# Patient Record
Sex: MALE | Race: WHITE | NOT HISPANIC OR LATINO | Employment: OTHER | URBAN - METROPOLITAN AREA
[De-identification: names, ages, dates, MRNs, and addresses within clinical notes are randomized per-mention and may not be internally consistent; named-entity substitution may affect disease eponyms.]

---

## 2021-02-03 ENCOUNTER — IMMUNIZATION (OUTPATIENT)
Dept: PEDIATRICS | Facility: CLINIC | Age: 81
End: 2021-02-03
Payer: MEDICARE

## 2021-02-03 PROCEDURE — 91300 PR COVID VAC PFIZER DIL RECON 30 MCG/0.3 ML IM: CPT

## 2021-02-03 PROCEDURE — 0001A PR COVID VAC PFIZER DIL RECON 30 MCG/0.3 ML IM: CPT

## 2021-02-24 ENCOUNTER — IMMUNIZATION (OUTPATIENT)
Dept: PEDIATRICS | Facility: CLINIC | Age: 81
End: 2021-02-24
Attending: INTERNAL MEDICINE
Payer: MEDICARE

## 2021-02-24 PROCEDURE — 0002A PR COVID VAC PFIZER DIL RECON 30 MCG/0.3 ML IM: CPT

## 2021-02-24 PROCEDURE — 91300 PR COVID VAC PFIZER DIL RECON 30 MCG/0.3 ML IM: CPT

## 2021-03-07 ENCOUNTER — HEALTH MAINTENANCE LETTER (OUTPATIENT)
Age: 81
End: 2021-03-07

## 2021-10-11 ENCOUNTER — HEALTH MAINTENANCE LETTER (OUTPATIENT)
Age: 81
End: 2021-10-11

## 2022-03-27 ENCOUNTER — HEALTH MAINTENANCE LETTER (OUTPATIENT)
Age: 82
End: 2022-03-27

## 2022-06-02 ENCOUNTER — TELEPHONE (OUTPATIENT)
Dept: FAMILY MEDICINE | Facility: CLINIC | Age: 82
End: 2022-06-02
Payer: MEDICARE

## 2022-06-02 NOTE — TELEPHONE ENCOUNTER
Called pt's wife to discuss role of [facility]. Discussed what services FNAs are able to provide for non-established and established patients of [facility]. Gave phone number for general FNA line and instructed her to have Tunde call if he's interested in speaking with an FNA.    Wife states the patient has had chronic hip pain that they would like to get assessed. He's also looking to establish care as his current PCP is semi-retired. They will wait for upcoming appointment but was hoping to get in sooner. Did offer to transfer the spouse to scheduling to see if another provider was available for a sooner date or at another clinic, and she declined.    Encouraged wife to have the patient call if he's interested in speaking with a FNA. She verbalized her understanding.    No further questions/concerns at this time.      Rhoda Brennan RN   06/02/22 11:35 AM  [facility] Nurse Advisor

## 2022-06-02 NOTE — TELEPHONE ENCOUNTER
Reviewed chart, Pt is not an established primary care patient. Has never seen a Dr. Myers or a primary care provider at [facility]. There are no 40 min appointments available with Sylvia Myers MD sooner.     Advise pt speak with central scheduling to see if there is an appointment with another provider available sooner or care advise if allows fna to triage.     Please do not send message back to sender, due to non established pt, should be directed to fna.  Thank you  Ilda Cardona RN  [facility]

## 2022-06-02 NOTE — TELEPHONE ENCOUNTER
Reason for call:  Other   Patient called regarding (reason for call): appointment  Additional comments: Patient's spouse Kindra to inquire if a new appointment with Dr. Myers could be scheduld on an earlier date than the patient's 6/24/22 current date, due to ongoing back discomfort that has been worsening and making it more difficult for the patient to walk. Please advise. Declined fna.    Phone number to reach patient:  Home number on file [phone] (home)     Best Time:  Asap    Can we leave a detailed message on this number?  YES    Travel screening: Not Applicable

## 2022-06-24 ENCOUNTER — OFFICE VISIT (OUTPATIENT)
Dept: FAMILY MEDICINE | Facility: CLINIC | Age: 82
End: 2022-06-24
Payer: MEDICARE

## 2022-06-24 VITALS
RESPIRATION RATE: 18 BRPM | HEART RATE: 78 BPM | TEMPERATURE: 98.8 F | WEIGHT: 233 LBS | HEIGHT: 66 IN | SYSTOLIC BLOOD PRESSURE: 130 MMHG | BODY MASS INDEX: 37.45 KG/M2 | OXYGEN SATURATION: 95 % | DIASTOLIC BLOOD PRESSURE: 67 MMHG

## 2022-06-24 DIAGNOSIS — Z23 ENCOUNTER FOR IMMUNIZATION: ICD-10-CM

## 2022-06-24 DIAGNOSIS — N40.0 BENIGN PROSTATIC HYPERPLASIA, UNSPECIFIED WHETHER LOWER URINARY TRACT SYMPTOMS PRESENT: ICD-10-CM

## 2022-06-24 DIAGNOSIS — N18.30 CKD STAGE 3 DUE TO TYPE 2 DIABETES MELLITUS (H): ICD-10-CM

## 2022-06-24 DIAGNOSIS — M15.9 GENERALIZED OSTEOARTHRITIS: ICD-10-CM

## 2022-06-24 DIAGNOSIS — I10 BENIGN ESSENTIAL HYPERTENSION: ICD-10-CM

## 2022-06-24 DIAGNOSIS — E66.01 MORBID OBESITY (H): ICD-10-CM

## 2022-06-24 DIAGNOSIS — E08.319 DIABETIC RETINOPATHY WITHOUT MACULAR EDEMA ASSOCIATED WITH DIABETES MELLITUS DUE TO UNDERLYING CONDITION, UNSPECIFIED LATERALITY, UNSPECIFIED RETINOPATHY SEVERITY (H): Primary | ICD-10-CM

## 2022-06-24 DIAGNOSIS — E78.2 MIXED HYPERLIPIDEMIA: ICD-10-CM

## 2022-06-24 DIAGNOSIS — E11.22 CKD STAGE 3 DUE TO TYPE 2 DIABETES MELLITUS (H): ICD-10-CM

## 2022-06-24 PROBLEM — E11.9 DIABETES MELLITUS, TYPE II (H): Status: ACTIVE | Noted: 2017-11-14

## 2022-06-24 PROBLEM — K59.09 CHRONIC CONSTIPATION: Status: ACTIVE | Noted: 2017-03-09

## 2022-06-24 PROCEDURE — 99204 OFFICE O/P NEW MOD 45 MIN: CPT | Mod: 25 | Performed by: PREVENTIVE MEDICINE

## 2022-06-24 PROCEDURE — 90714 TD VACC NO PRESV 7 YRS+ IM: CPT | Performed by: PREVENTIVE MEDICINE

## 2022-06-24 PROCEDURE — 90471 IMMUNIZATION ADMIN: CPT | Performed by: PREVENTIVE MEDICINE

## 2022-06-24 RX ORDER — GLUCOSAMINE HCL/CHONDROITIN SU 500-400 MG
CAPSULE ORAL
Qty: 100 EACH | Refills: 3 | Status: SHIPPED | OUTPATIENT
Start: 2022-06-24

## 2022-06-24 RX ORDER — EZETIMIBE 10 MG/1
10 TABLET ORAL DAILY
Qty: 90 TABLET | Refills: 3 | Status: SHIPPED | OUTPATIENT
Start: 2022-06-24 | End: 2023-09-19

## 2022-06-24 RX ORDER — ROSUVASTATIN CALCIUM 20 MG/1
20 TABLET, COATED ORAL DAILY
Qty: 90 TABLET | Refills: 3 | Status: SHIPPED | OUTPATIENT
Start: 2022-06-24 | End: 2023-08-18

## 2022-06-24 RX ORDER — LANCETS
EACH MISCELLANEOUS
Qty: 100 EACH | Refills: 6 | Status: SHIPPED | OUTPATIENT
Start: 2022-06-24

## 2022-06-24 RX ORDER — MELOXICAM 7.5 MG/1
7.5 TABLET ORAL DAILY
Qty: 90 TABLET | Refills: 3 | Status: SHIPPED | OUTPATIENT
Start: 2022-06-24 | End: 2024-01-02

## 2022-06-24 RX ORDER — NAPROXEN SODIUM 220 MG
220 TABLET ORAL 2 TIMES DAILY WITH MEALS
COMMUNITY
End: 2022-07-27

## 2022-06-24 RX ORDER — EZETIMIBE 10 MG/1
TABLET ORAL
COMMUNITY
Start: 2022-05-04 | End: 2022-06-24

## 2022-06-24 RX ORDER — ALFUZOSIN HYDROCHLORIDE 10 MG/1
TABLET, EXTENDED RELEASE ORAL
COMMUNITY
Start: 2022-05-04 | End: 2022-06-24

## 2022-06-24 RX ORDER — SITAGLIPTIN AND METFORMIN HYDROCHLORIDE 1000; 50 MG/1; MG/1
1 TABLET, FILM COATED ORAL DAILY
Qty: 90 TABLET | Refills: 3 | Status: SHIPPED | OUTPATIENT
Start: 2022-06-24 | End: 2023-09-19

## 2022-06-24 RX ORDER — LOSARTAN POTASSIUM 50 MG/1
50 TABLET ORAL DAILY
Qty: 90 TABLET | Refills: 3 | Status: SHIPPED | OUTPATIENT
Start: 2022-06-24 | End: 2022-11-16

## 2022-06-24 RX ORDER — SPIRONOLACTONE 25 MG/1
TABLET ORAL
COMMUNITY
Start: 2021-04-28 | End: 2022-06-24

## 2022-06-24 RX ORDER — AMOXICILLIN 500 MG/1
CAPSULE ORAL
COMMUNITY
Start: 2021-12-04 | End: 2023-07-11

## 2022-06-24 RX ORDER — ROSUVASTATIN CALCIUM 20 MG/1
TABLET, COATED ORAL
COMMUNITY
Start: 2022-01-29 | End: 2022-06-24

## 2022-06-24 RX ORDER — SITAGLIPTIN AND METFORMIN HYDROCHLORIDE 1000; 50 MG/1; MG/1
TABLET, FILM COATED ORAL
COMMUNITY
Start: 2022-06-13 | End: 2022-06-24

## 2022-06-24 RX ORDER — ALFUZOSIN HYDROCHLORIDE 10 MG/1
10 TABLET, EXTENDED RELEASE ORAL DAILY
Qty: 90 TABLET | Refills: 3 | Status: SHIPPED | OUTPATIENT
Start: 2022-06-24 | End: 2023-08-18

## 2022-06-24 RX ORDER — LOSARTAN POTASSIUM 50 MG/1
TABLET ORAL
COMMUNITY
Start: 2021-04-28 | End: 2022-06-24

## 2022-06-24 RX ORDER — SPIRONOLACTONE 25 MG/1
25 TABLET ORAL DAILY
Qty: 90 TABLET | Refills: 3 | Status: SHIPPED | OUTPATIENT
Start: 2022-06-24 | End: 2023-09-19

## 2022-06-24 RX ORDER — ACETAMINOPHEN 500 MG
500-1000 TABLET ORAL EVERY 6 HOURS PRN
COMMUNITY

## 2022-06-24 ASSESSMENT — PAIN SCALES - GENERAL: PAINLEVEL: SEVERE PAIN (6)

## 2022-06-24 NOTE — PROGRESS NOTES
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 due to underlying condition, unspecified laterality, unspecified retinopathy severity (H)  -last Hba1C was 7  -continue current medication   - blood glucose monitoring (NO BRAND SPECIFIED) meter device kit  Dispense: 1 kit; Refill: 0  - blood glucose (NO BRAND SPECIFIED) test strip  Dispense: 100 strip; Refill: 6  - thin (NO BRAND SPECIFIED) lancets  Dispense: 100 each; Refill: 6  - alcohol swab prep pads  Dispense: 100 each; Refill: 3  - JANUMET  MG tablet  Dispense: 90 tablet; Refill: 3    Morbid obesity (H)  -Physical activity limited due to joint pains     Generalized osteoarthritis  -declined PT for now   - Orthopedic  Referral  - meloxicam (MOBIC) 7.5 MG tablet  Dispense: 90 tablet; Refill: 3    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)  -Last GFR was 49    Benign essential hypertension  -at goal  -continue current medication   - losartan (COZAAR) 50 MG tablet  Dispense: 90 tablet; Refill: 3  - spironolactone (ALDACTONE) 25 MG tablet  Dispense: 90 tablet; Refill: 3    Mixed hyperlipidemia  - ezetimibe (ZETIA) 10 MG tablet  Dispense: 90 tablet; Refill: 3  - rosuvastatin (CRESTOR) 20 MG tablet  Dispense: 90 tablet; Refill: 3    Benign prostatic hyperplasia, unspecified whether lower urinary tract symptoms present  - alfuzosin ER (UROXATRAL) 10 MG 24 hr tablet  Dispense: 90 tablet; Refill: 3    Encounter for immunization  - TD PRESERV FREE, IM (7+ YRS) (DECAVAC/TENIVAC)      Review of external notes as documented elsewhere in note  Prescription drug management  30 minutes spent on the date of the encounter doing chart review, history and exam, documentation and further activities per the note       BMI:   Estimated body mass index is 37.61 kg/m  as calculated from the following:    Height as of this encounter: 1.676 m (5' 6\").    Weight as of this encounter: 105.7 kg (233 lb).       Return in " about 6 months (around 12/24/2022) for Follow up, with me, in person.    Sylvia Myers MD MPH    [facility] is a 82 year old accompanied by his spouse., presenting for the following health issues:  Establish Care      HPI       Here as a new patient  Extensive review or records done via Care Everywhere    Pain History:  When did you first notice your pain? - Acute Pain   Have you seen any provider previously for this issue? Yes -   How has your pain affected your ability to work? Not applicable  Where in your body do you have pain? Musculoskeletal problem/pain  Onset/Duration: about 1 year ago  Description  Location: hip - left  Joint Swelling: no  Redness: no  Pain: YES  Warmth: no  Intensity:  6/10  Progression of Symptoms:  worsening and intermittent  Accompanying signs and symptoms:   Fevers: no  Numbness/tingling/weakness: no  History  Trauma to the area: YES- Fell  Recent illness:  no  Previous similar problem: YES  Previous evaluation:  YES  Precipitating or alleviating factors:  Aggravating factors include: walking  Therapies tried and outcome: acetaminophen    Has generalized pain, knees, hips etc. Has had both knees replaced and one hip replaced   Was a wrestler in high school and college  Has been using Biofreeze  CBD pain reliever  Has not had any recent cortisone injections   No recent PT , prefers not to continue at this time     Needs a new glucometer     Has been on Celebrex, did not help  Has not been on Meloxicam     Diabetes:  -eye exam done 5/9/22 with Shira  -not checking glucose since does not have a working glucometer       Hyperlipidemia Follow-Up      Are you regularly taking any medication or supplement to lower your cholesterol?   Yes- statin     Are you having muscle aches or other side effects that you think could be caused by your cholesterol lowering medication?  No    Hypertension Follow-up      Do you check your blood pressure  "regularly outside of the clinic? No     Are you following a low salt diet? Yes    Are your blood pressures ever more than 140 on the top number (systolic) OR more   than 90 on the bottom number (diastolic), for example 140/90? No      Review of Systems   Constitutional, HEENT, cardiovascular, pulmonary, gi and gu systems are negative, except as otherwise noted.      Objective    /67 (BP Location: Left arm, Patient Position: Chair, Cuff Size: Adult Large)   Pulse 78   Temp 98.8  F (37.1  C) (Tympanic)   Resp 18   Ht 1.676 m (5' 6\")   Wt 105.7 kg (233 lb)   SpO2 95%   BMI 37.61 kg/m    Body mass index is 37.61 kg/m .  Physical Exam   GENERAL APPEARANCE: healthy, alert and no distress  EYES: Eyes grossly normal to inspection and conjunctivae and sclerae normal  RESP: lungs clear to auscultation - no rales, rhonchi or wheezes  CV: regular rates and rhythm, normal S1 S2  NEURO: Normal strength and tone, mentation intact and speech normal  PSYCH: mentation appears normal      No results found for this or any previous visit (from the past 24 hour(s)).      Labs done with Shira 5/5/22:    Lipid panel: , LDL 55, HDL 30    BMP: Glucose 148, Cr 1.42, GFR 49, potassium normal    PSA 1.10    HbA1C 7.0                 .  ..  "

## 2022-06-24 NOTE — NURSING NOTE
Prior to immunization administration, verified patients identity using patient s name and date of birth. Please see Immunization Activity for additional information.     Screening Questionnaire for Adult Immunization    Are you sick today?   No   Do you have allergies to medications, food, a vaccine component or latex?   No   Have you ever had a serious reaction after receiving a vaccination?   No   Do you have a long-term health problem with heart, lung, kidney, or metabolic disease (e.g., diabetes), asthma, a blood disorder, no spleen, complement component deficiency, a cochlear implant, or a spinal fluid leak?  Are you on long-term aspirin therapy?   Yes   Do you have cancer, leukemia, HIV/AIDS, or any other immune system problem?   No   Do you have a parent, brother, or sister with an immune system problem?   No   In the past 3 months, have you taken medications that affect  your immune system, such as prednisone, other steroids, or anticancer drugs; drugs for the treatment of rheumatoid arthritis, Crohn s disease, or psoriasis; or have you had radiation treatments?   No   Have you had a seizure, or a brain or other nervous system problem?   No   During the past year, have you received a transfusion of blood or blood    products, or been given immune (gamma) globulin or antiviral drug?   No   For women: Are you pregnant or is there a chance you could become       pregnant during the next month?   No   Have you received any vaccinations in the past 4 weeks?   No     Immunization questionnaire was positive for at least one answer.  Notified provider aware.        Patient instructed to remain in clinic for 15 minutes afterwards, and to report any adverse reaction to me immediately.       Screening performed by Mayank Garcia MA on 6/24/2022 at 9:24 AM.

## 2022-06-24 NOTE — PATIENT INSTRUCTIONS
At [facility], we strive to deliver an exceptional experience to you, every time we see you. If you receive a survey, please complete it as we do value your feedback.  If you have MyChart, you can expect to receive results automatically within 24 hours of their completion.  Your provider will send a note interpreting your results as well.   If you do not have MyChart, you should receive your results in about a week by mail.    Your care team:                            Family Medicine Internal Medicine   MD John Sanchez MD Shantel Branch-Fleming, MD Srinivasa Vaka, MD Katya Belousova, ROSALIA White CNP, MD ([facility]   Slim Whitman, MD Latha Boyd MD Amelia Massimini APRN ROSALIA Nix CNP, MD             Clinic hours: Monday - Thursday 7 am-6 pm; Fridays 7 am-5 pm.   Urgent care: Monday - Friday 10 am- 8 pm; Saturday and Sunday 9 am-5 pm.    Clinic: [phone]       [facility]: Monday - Thursday 8 am - 7 pm; Friday 8 am - 6 pm  [facility]: [phone]       ACETAMINOPHEN 1000 MG EVERY 8 HOURS   LIDOCAINE PATCHES 4% OVER THE COUNTER  CAPSAICIN CREAM  DICLOFENAC GEL

## 2022-07-05 NOTE — TELEPHONE ENCOUNTER
DIAGNOSIS: Bilat knee pain -Generalized osteoarthritis *possible injections   APPOINTMENT DATE: 07/13/2022   NOTES STATUS DETAILS   OFFICE NOTE from referring provider Internal 06/24/2022 Dr Myers [facility]    OFFICE NOTE from other specialist N/A    DISCHARGE SUMMARY from hospital N/A    DISCHARGE REPORT from the ER N/A    OPERATIVE REPORT Care Everywhere 07/06/2015 LFT ARTHROPLASTY KNEE    06/16/2014  RT ARTHROPLASTY KNEE    06/25/2013 RT ARTHROPLASTY HIP    EMG report N/A    MEDICATION LIST N/A    MRI N/A    DEXA (osteoporosis/bone health) N/A    CT SCAN N/A    XRAYS (IMAGES & REPORTS) N/A

## 2022-07-12 ENCOUNTER — TELEPHONE (OUTPATIENT)
Dept: ORTHOPEDICS | Facility: CLINIC | Age: 82
End: 2022-07-12

## 2022-07-12 ENCOUNTER — OFFICE VISIT (OUTPATIENT)
Dept: URGENT CARE | Facility: URGENT CARE | Age: 82
End: 2022-07-12
Payer: MEDICARE

## 2022-07-12 VITALS
HEIGHT: 66 IN | TEMPERATURE: 100.2 F | SYSTOLIC BLOOD PRESSURE: 139 MMHG | DIASTOLIC BLOOD PRESSURE: 76 MMHG | WEIGHT: 234.1 LBS | OXYGEN SATURATION: 95 % | BODY MASS INDEX: 37.62 KG/M2 | HEART RATE: 73 BPM

## 2022-07-12 DIAGNOSIS — R47.89 WORD FINDING DIFFICULTY: ICD-10-CM

## 2022-07-12 DIAGNOSIS — R51.9 ACUTE INTRACTABLE HEADACHE, UNSPECIFIED HEADACHE TYPE: Primary | ICD-10-CM

## 2022-07-12 DIAGNOSIS — R41.0 CONFUSION: ICD-10-CM

## 2022-07-12 DIAGNOSIS — R41.3 MEMORY LOSS: ICD-10-CM

## 2022-07-12 DIAGNOSIS — R42 DIZZINESS: ICD-10-CM

## 2022-07-12 PROCEDURE — 99214 OFFICE O/P EST MOD 30 MIN: CPT | Performed by: PHYSICIAN ASSISTANT

## 2022-07-12 ASSESSMENT — ENCOUNTER SYMPTOMS
DIARRHEA: 0
CARDIOVASCULAR NEGATIVE: 1
DIZZINESS: 1
TREMORS: 0
HEMATOCHEZIA: 0
CONSTIPATION: 0
NAUSEA: 0
WEAKNESS: 0
COUGH: 0
FATIGUE: 0
SHORTNESS OF BREATH: 1
SORE THROAT: 0
NUMBNESS: 0
FACIAL ASYMMETRY: 0
CHILLS: 0
GASTROINTESTINAL NEGATIVE: 1
RHINORRHEA: 0
PALPITATIONS: 0
SINUS PRESSURE: 0
WHEEZING: 0
CHEST TIGHTNESS: 0
SEIZURES: 0
ABDOMINAL PAIN: 0
SPEECH DIFFICULTY: 1
FEVER: 0
CONFUSION: 1
HEADACHES: 1
VOMITING: 0
PARESTHESIAS: 0
LIGHT-HEADEDNESS: 0

## 2022-07-12 NOTE — TELEPHONE ENCOUNTER
Patient has a history of RTKA DOS: 6/16/2014 and LTKA DOS: 7/6/2015 and RTHA DOS: 6/25/2013.    Patient is having pain in bilateral knees and bilateral hip pain. They were thinking an injection would help but after talking with our sports medicine doctors, they stated a cortisone injection would not be indicated. I scheduled with Dr. Howell to discuss knee and hip pain. Informed patient that he will need xrays prior to appointment and to come 20 minutes early. They understood.      Radha Gallegos MA, ATC

## 2022-07-12 NOTE — PROGRESS NOTES
Santiago Griffiths is a 82 year old accompanied by his spouse and daughter, presenting for the following health issues:  Speech Problem and Altered Mental Status (Pt said that he seems confused)    HPI   Headache, dizziness, speech problem  Onset/Duration: 2days.  Symptoms started after having had dental work done yesterday.  Description  Location: frontal  Character: throbbing pain  Frequency:  daily  Duration:  hours  Wake with headaches: no  Able to do daily activities when headache present: YES  Intensity:  moderate  Progression of Symptoms:  same  Accompanying signs and symptoms:  Stiff neck: no  Neck or upper back pain: Yes  Sinus or URI symptoms: no  Fever: no  Nausea or vomiting: no  Dizziness: Yes along with memory loss, confusion, difficulty finding words.  Some shortness of breath but no chest pain, palpitations, orthopnea, PND or peripheral edema.    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 due to underlying condition (H)     Hypertension     Morbid obesity (H)     Hyperlipidemia     Current Outpatient Medications   Medication     acetaminophen (TYLENOL) 500 MG tablet     alcohol swab prep pads     alfuzosin ER (UROXATRAL) 10 MG 24 hr tablet     amoxicillin (AMOXIL) 500 MG capsule     blood glucose (NO BRAND SPECIFIED) test strip     blood glucose monitoring (NO BRAND SPECIFIED) meter device kit     ezetimibe (ZETIA) 10 MG tablet     HEMP OIL OR EXTRACT OR OTHER CBD CANNABINOID, NOT MEDICAL CANNABIS,      "JANUMET  MG tablet     losartan (COZAAR) 50 MG tablet     meloxicam (MOBIC) 7.5 MG tablet     naproxen sodium (ANAPROX) 220 MG tablet     rosuvastatin (CRESTOR) 20 MG tablet     spironolactone (ALDACTONE) 25 MG tablet     thin (NO BRAND SPECIFIED) lancets     No current facility-administered medications for this visit.        Allergies   Allergen Reactions     Ace Inhibitors Cough       Review of Systems   Constitutional: Negative for chills, fatigue and fever.   HENT: Negative for congestion, ear discharge, ear pain, hearing loss, rhinorrhea, sinus pressure and sore throat.    Respiratory: Positive for shortness of breath. Negative for cough, chest tightness and wheezing.    Cardiovascular: Negative.  Negative for chest pain, palpitations and peripheral edema.   Gastrointestinal: Negative.  Negative for abdominal pain, constipation, diarrhea, hematochezia, nausea and vomiting.   Neurological: Positive for dizziness, speech difficulty and headaches. Negative for tremors, seizures, syncope, facial asymmetry, weakness, light-headedness, numbness and paresthesias.   Psychiatric/Behavioral: Positive for confusion.   All other systems reviewed and are negative.           Objective    /76 (BP Location: Left arm, Patient Position: Sitting, Cuff Size: Adult Large)   Pulse 73   Temp 100.2  F (37.9  C) (Tympanic)   Ht 1.676 m (5' 6\")   Wt 106.2 kg (234 lb 1.6 oz)   SpO2 95%   BMI 37.78 kg/m    Body mass index is 37.78 kg/m .  Physical Exam  Vitals and nursing note reviewed.   Constitutional:       General: He is not in acute distress.     Appearance: Normal appearance. He is well-developed and normal weight. He is not ill-appearing.   HENT:      Head: Normocephalic and atraumatic.      Ears:      Comments: TMs are intact without any erythema or bulging bilaterally.  Airway is patent.     Nose: Nose normal.      Mouth/Throat:      Lips: Pink.      Mouth: Mucous membranes are moist.      Pharynx: Oropharynx " is clear. Uvula midline. No pharyngeal swelling, oropharyngeal exudate or posterior oropharyngeal erythema.   Eyes:      General: No scleral icterus.     Extraocular Movements: Extraocular movements intact.      Conjunctiva/sclera: Conjunctivae normal.      Pupils: Pupils are equal, round, and reactive to light.   Neck:      Thyroid: No thyromegaly.      Meningeal: Brudzinski's sign and Kernig's sign absent.   Cardiovascular:      Rate and Rhythm: Normal rate and regular rhythm.      Pulses: Normal pulses.      Heart sounds: Normal heart sounds, S1 normal and S2 normal. No murmur heard.    No friction rub. No gallop.   Pulmonary:      Effort: Pulmonary effort is normal. No respiratory distress.      Breath sounds: Normal breath sounds and air entry. No wheezing or rales.   Musculoskeletal:      Cervical back: Full passive range of motion without pain, normal range of motion and neck supple.   Lymphadenopathy:      Cervical: No cervical adenopathy.   Skin:     General: Skin is warm and dry.      Capillary Refill: Capillary refill takes less than 2 seconds.   Neurological:      General: No focal deficit present.      Mental Status: He is alert and oriented to person, place, and time.      GCS: GCS eye subscore is 4. GCS verbal subscore is 5. GCS motor subscore is 6.      Cranial Nerves: Cranial nerves 2-12 are intact. No cranial nerve deficit.      Sensory: Sensation is intact. No sensory deficit.      Motor: Motor function is intact.      Coordination: Coordination is intact. Coordination normal.      Gait: Gait abnormal.      Deep Tendon Reflexes: Reflexes are normal and symmetric.   Psychiatric:         Mood and Affect: Mood normal.         Behavior: Behavior normal.         Thought Content: Thought content normal.         Judgment: Judgment normal.          Assessment/Plan:  Acute intractable headache, unspecified headache type:  Along with dizziness, speech difficulty, and memory loss/confusion.  H&P is  concerning for CVA/TIA vs infectious cause vs metabolic cause.  Recommend further evaluation and management in the ER.  Will most likely need further workup with labs and/or imaging.  Patient has declined transportation via ambulance and will have family drive her/him.  Understands risks and benefits of ambulance transfer and s/he has declined.  Call 911 if worsening symptoms.  S/he plans to go to [place] [facility].  S/he left in stable condition with AVS in hand.  F/u with PCP after ER visit.     Dizziness    Confusion    Memory loss    Word finding difficulty        Izzy See OUMAR Da Silva              .  ..

## 2022-07-13 ENCOUNTER — PRE VISIT (OUTPATIENT)
Dept: ORTHOPEDICS | Facility: CLINIC | Age: 82
End: 2022-07-13

## 2022-07-18 NOTE — TELEPHONE ENCOUNTER
DIAGNOSIS: Bilateral knee and bilateral hip pain; s/p Bilateral knee arthroplasty done at [facility]   APPOINTMENT DATE: 07/26/2022   NOTES STATUS DETAILS   OFFICE NOTE from referring provider Internal 06/24/2022 Dr Myers [facility]   OFFICE NOTE from other specialist N/A    DISCHARGE SUMMARY from hospital N/A    DISCHARGE REPORT from the ER N/A    OPERATIVE REPORT Received 07/06/2015 LFT ARTHROPLASTY KNEE    06/16/2014  RT ARTHROPLASTY KNEE    06/25/2013 RT ARTHROPLASTY HIP    MEDICATION LIST N/A    EMG (for Spine) N/A    IMPLANT RECORD/STICKER N/A    LABS     CBC/DIFF N/A    CULTURES N/A    INJECTIONS DONE IN RADIOLOGY N/A    MRI N/A    CT SCAN N/A    XRAYS (IMAGES & REPORTS) N/A    TUMOR     PATHOLOGY  Slides & report N/A

## 2022-07-22 DIAGNOSIS — M25.561 BILATERAL KNEE PAIN: Primary | ICD-10-CM

## 2022-07-22 DIAGNOSIS — M25.562 BILATERAL KNEE PAIN: Primary | ICD-10-CM

## 2022-07-26 ENCOUNTER — ANCILLARY PROCEDURE (OUTPATIENT)
Dept: GENERAL RADIOLOGY | Facility: CLINIC | Age: 82
End: 2022-07-26
Attending: ORTHOPAEDIC SURGERY
Payer: MEDICARE

## 2022-07-26 ENCOUNTER — PRE VISIT (OUTPATIENT)
Dept: ORTHOPEDICS | Facility: CLINIC | Age: 82
End: 2022-07-26

## 2022-07-26 ENCOUNTER — OFFICE VISIT (OUTPATIENT)
Dept: ORTHOPEDICS | Facility: CLINIC | Age: 82
End: 2022-07-26
Payer: MEDICARE

## 2022-07-26 VITALS — BODY MASS INDEX: 34.07 KG/M2 | HEIGHT: 69 IN | WEIGHT: 230 LBS

## 2022-07-26 DIAGNOSIS — M25.561 BILATERAL KNEE PAIN: ICD-10-CM

## 2022-07-26 DIAGNOSIS — Z96.653 STATUS POST TOTAL BILATERAL KNEE REPLACEMENT: Primary | ICD-10-CM

## 2022-07-26 DIAGNOSIS — M25.562 BILATERAL KNEE PAIN: ICD-10-CM

## 2022-07-26 PROCEDURE — 73564 X-RAY EXAM KNEE 4 OR MORE: CPT | Mod: GC | Performed by: RADIOLOGY

## 2022-07-26 PROCEDURE — 99202 OFFICE O/P NEW SF 15 MIN: CPT | Performed by: ORTHOPAEDIC SURGERY

## 2022-07-26 ASSESSMENT — PAIN SCALES - GENERAL: PAINLEVEL: SEVERE PAIN (7)

## 2022-07-26 NOTE — TELEPHONE ENCOUNTER
DIAGNOSIS: neck and back pain onset 3 weeks ago    APPOINTMENT DATE: 08/12/2022   NOTES STATUS DETAILS   OFFICE NOTE from referring provider Internal 07/26/2022 Dr Howell   OFFICE NOTE from other specialist Care Everywhere 09/08/2021 Dr Mccartney [facility]   DISCHARGE SUMMARY from hospital N/A    DISCHARGE REPORT from the ER N/A    OPERATIVE REPORT N/A    EMG report N/A    MEDICATION LIST N/A    MRI N/A    DEXA (osteoporosis/bone health) N/A    CT SCAN N/A    XRAYS (IMAGES & REPORTS) Received 09/08/2021 lumbar spine     Images in PACS

## 2022-07-26 NOTE — NURSING NOTE
"Tunde Gutierrez's chief complaint for this visit includes:  Chief Complaint   Patient presents with     Consult     Bilateral knee pain.      PCP: Dylan Mccartney    Referring Provider:  No referring provider defined for this encounter.    Ht 1.753 m (5' 9\")   Wt 104.3 kg (230 lb)   BMI 33.97 kg/m    Severe Pain (7)     Pain increases with: Walking, standing  Previous surgeries: s/p RTKA dos: 6/16/2014; s/p LTKA dos: 7/16/15; s/p RTHA dos: 6/25/13  Treatments done: Nothing  Imaging completed: XR knees today      Radha Gallegos, ATC  "

## 2022-07-26 NOTE — LETTER
7/26/2022         RE: Tunde Gutierrez  [address]  [place] [address]        Dear Colleague,    Thank you for referring your patient, Tunde Gutierrez, to the [facility]. Please see a copy of my visit note below.    Assessment: This is a 82 year old with well functioning total  Knee arthroplasties and anterior thigh and vague anterior knee pain. I am not able to reproduce with physical examination and this in concert with stable appearing knees I think its reasonable to look outside of the knee for the etiology.     Plan:  Spinal evaluation.     Chief Complaint: follow-up bilateral TKA     Physician:  No ref. provider found    HPI: Tunde Gutierrez is a 82 year old male who presents today for evaluation of his knees. He reports that he had two falls on the ice this past winter and a resulting decrease in his knee function. He noticed that it became more difficult to get out of bed because of the knees being sore (L+R). He continues to walk for exercise, uses a topical pain spray. He is using a wheel chair and he reports that this is for anterior thigh pain down to the knees. Bilaterally.     MEDICAL HISTORY:   Past Medical History:   Diagnosis Date     Diabetes mellitus, type 2 (H)        Medications:     Current Outpatient Medications:      acetaminophen (TYLENOL) 500 MG tablet, Take 500-1,000 mg by mouth every 6 hours as needed for mild pain, Disp: , Rfl:      alcohol swab prep pads, Use to swab area of injection/nina as directed., Disp: 100 each, Rfl: 3     alfuzosin ER (UROXATRAL) 10 MG 24 hr tablet, Take 1 tablet (10 mg) by mouth daily, Disp: 90 tablet, Rfl: 3     amoxicillin (AMOXIL) 500 MG capsule, TAKE 4 CAPSULES BY MOUTH 1 HOUR PRIOR TO APPT, Disp: , Rfl:      blood glucose (NO BRAND SPECIFIED) test strip, Use to test blood sugar 1 times daily or as directed. To accompany: Blood Glucose Monitor Brands: per insurance., Disp: 100 strip, Rfl: 6     blood  glucose monitoring (NO BRAND SPECIFIED) meter device kit, Use to test blood sugar 1 times daily or as directed. Preferred blood glucose meter OR supplies to accompany: Blood Glucose Monitor Brands: per insurance., Disp: 1 kit, Rfl: 0     ezetimibe (ZETIA) 10 MG tablet, Take 1 tablet (10 mg) by mouth daily, Disp: 90 tablet, Rfl: 3     HEMP OIL OR EXTRACT OR OTHER CBD CANNABINOID, NOT MEDICAL CANNABIS,, , Disp: , Rfl:      JANUMET  MG tablet, Take 1 tablet by mouth daily, Disp: 90 tablet, Rfl: 3     losartan (COZAAR) 50 MG tablet, Take 1 tablet (50 mg) by mouth daily, Disp: 90 tablet, Rfl: 3     meloxicam (MOBIC) 7.5 MG tablet, Take 1 tablet (7.5 mg) by mouth daily, Disp: 90 tablet, Rfl: 3     naproxen sodium (ANAPROX) 220 MG tablet, Take 220 mg by mouth 2 times daily (with meals), Disp: , Rfl:      rosuvastatin (CRESTOR) 20 MG tablet, Take 1 tablet (20 mg) by mouth daily, Disp: 90 tablet, Rfl: 3     spironolactone (ALDACTONE) 25 MG tablet, Take 1 tablet (25 mg) by mouth daily, Disp: 90 tablet, Rfl: 3     thin (NO BRAND SPECIFIED) lancets, Use to test blood sugar 1 times daily or as directed. To accompany: Blood Glucose Monitor Brands: per insurance., Disp: 100 each, Rfl: 6    Allergies: Ace inhibitors    SURGICAL HISTORY:   Past Surgical History:   Procedure Laterality Date     JOINT REPLACEMENT         FAMILY HISTORY: No family history on file.    SOCIAL HISTORY:   Social History     Tobacco Use     Smoking status: Never Smoker     Smokeless tobacco: Never Used   Substance Use Topics     Alcohol use: Not Currently       REVIEW OF SYSTEMS:  The comprehensive review of systems from the intake form was reviewed with the patient.  No fever, weight change or fatigue. No dry eyes. No oral ulcers, sore throat or voice change. No palpitations, syncope, angina or edema.  No chest pain, excessive sleepiness, shortness of breath or hemoptysis.   No abdominal pain, nausea, vomiting, diarrhea or heartburn.  No skin rash.  "No focal weakness or numbness. No bleeding or lymphadenopathy. No rhinitis or hives.     Exam:  On physical examination the patient appears the stated age, is in no acute distress, affectThe is appropriate, and breathing is non-labored.  Vitals are documented in the EMR and have been reviewed:    Ht 1.753 m (5' 9\")   Wt 104.3 kg (230 lb)   BMI 33.97 kg/m    5' 9\"  Body mass index is 33.97 kg/m .    Rises from chair: with effort   Gait:forward flexed at the waist   Trendelenburg test:  Gains the exam table: easily     Right  Knee  Appearance: benign  Clinical alignment: neutral   Effusion: no  Tenderness to palpation:  Extension: 0  Flexion:   Collateral ligaments: intact  Cruciate ligaments: grossly intact     Left Knee  Appearance: benign  Clinical alignment: neurtal   Effusion: no  Tenderness to palpation:  Extension:0  Flexion:   Collateral ligaments: intact  Cruciate ligaments: grossly intact     I am not able to reproduce any symptoms with knee examination.   Bilaeral hip ROM is fluid and painless with 95 of flexion and no symptoms with FADIR.  Distally, the circulatory, motor, and sensation exam is intact with 5/5 EHL, gastroc-soleus, and tibialis anterior.  Sensation to light touch is intact.  Dorsalis pedis and posterior tibialis pulses are palpable.  There are no sores on the feet, no bruising, and no lymphedema.    X-rays:   Narrative & Impression     4  views right and left knee radiographs 7/26/2022 8:54 AM     History: Bilateral knee pain; Bilateral knee pain     Additional History from EMR: History of bilateral total knee  arthroplasties, presenting with bilateral knee pain     Comparison: None     Findings:     AP view of bilateral knees, tunnel, lateral and patellofemoral views  of the right and left knee were obtained.      Left:      Postsurgical changes of total knee arthroplasty. Hardware appears  well-seated and intact without evidence of lucency or loosening.     No acute osseous abnormality. " Small joint effusion.     Marginal osteophytic spurring of the distal femur and proximal tibia.     Marginal osteophytic spurring of the patella. No patellar tilt or  lateral subluxation.       Mineralization posterior to the knee joint which could represent joint  bodies versus osseous fragments. Vascular calcifications are noted.      Soft tissue is unremarkable.      Right:      Postsurgical changes of total knee arthroplasty. Hardware appears  well-seated and intact without evidence of loosening or lucency.     No acute osseous abnormality. Small joint effusion.     Marginal osteophytic spurring of the distal femur and proximal tibia.     Marginal osteophytic spurring of the patella. No patellar tilt or  lateral subluxation.      Numerous bodies of mineralization anterior and posterior to the knee  joint as well as superior to the patella visualized on lateral  radiograph, which could represent joint bodies versus osseous  fragments. Vascular calcifications noted.     Soft tissue is unremarkable.                                                                       Impression:  1. Postsurgical changes of bilateral total knee arthroplasties without  evidence of hardware complication.  2. 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     I have personally reviewed the examination and initial interpretation  and I agree with the findings.     YOSHI ROMERO MD (Joe)            Study Result    Narrative & Impression     4  views right and left knee radiographs 7/26/2022 8:54 AM     History: Bilateral knee pain; Bilateral knee pain     Additional History from EMR: History of bilateral total knee  arthroplasties, presenting with bilateral knee pain     Comparison: None     Findings:     AP view of bilateral knees, tunnel, lateral and patellofemoral views  of the right and left knee were obtained.      Left:      Postsurgical changes of  total knee arthroplasty. Hardware appears  well-seated and intact without evidence of lucency or loosening.     No acute osseous abnormality. Small joint effusion.     Marginal osteophytic spurring of the distal femur and proximal tibia.     Marginal osteophytic spurring of the patella. No patellar tilt or  lateral subluxation.       Mineralization posterior to the knee joint which could represent joint  bodies versus osseous fragments. Vascular calcifications are noted.      Soft tissue is unremarkable.      Right:      Postsurgical changes of total knee arthroplasty. Hardware appears  well-seated and intact without evidence of loosening or lucency.     No acute osseous abnormality. Small joint effusion.     Marginal osteophytic spurring of the distal femur and proximal tibia.     Marginal osteophytic spurring of the patella. No patellar tilt or  lateral subluxation.      Numerous bodies of mineralization anterior and posterior to the knee  joint as well as superior to the patella visualized on lateral  radiograph, which could represent joint bodies versus osseous  fragments. Vascular calcifications noted.     Soft tissue is unremarkable.                                                                       Impression:  1. Postsurgical changes of bilateral total knee arthroplasties without  evidence of hardware complication.  2. 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     I have personally reviewed the examination and initial interpretation  and I agree with the findings.     YOSHI ROMERO MD (Joe)              Again, thank you for allowing me to participate in the care of your patient.        Sincerely,        Diego Howell MD

## 2022-07-26 NOTE — PROGRESS NOTES
Assessment: This is a 82 year old with well functioning total  Knee arthroplasties and anterior thigh and vague anterior knee pain. I am not able to reproduce with physical examination and this in concert with stable appearing knees I think its reasonable to look outside of the knee for the etiology.     Plan:  Spinal evaluation.     Chief Complaint: follow-up bilateral TKA     Physician:  No ref. provider found    HPI: Tunde Gutierrez is a 82 year old male who presents today for evaluation of his knees. He reports that he had two falls on the ice this past winter and a resulting decrease in his knee function. He noticed that it became more difficult to get out of bed because of the knees being sore (L+R). He continues to walk for exercise, uses a topical pain spray. He is using a wheel chair and he reports that this is for anterior thigh pain down to the knees. Bilaterally.     MEDICAL HISTORY:   Past Medical History:   Diagnosis Date     Diabetes mellitus, type 2 (H)        Medications:     Current Outpatient Medications:      acetaminophen (TYLENOL) 500 MG tablet, Take 500-1,000 mg by mouth every 6 hours as needed for mild pain, Disp: , Rfl:      alcohol swab prep pads, Use to swab area of injection/nina as directed., Disp: 100 each, Rfl: 3     alfuzosin ER (UROXATRAL) 10 MG 24 hr tablet, Take 1 tablet (10 mg) by mouth daily, Disp: 90 tablet, Rfl: 3     amoxicillin (AMOXIL) 500 MG capsule, TAKE 4 CAPSULES BY MOUTH 1 HOUR PRIOR TO APPT, Disp: , Rfl:      blood glucose (NO BRAND SPECIFIED) test strip, Use to test blood sugar 1 times daily or as directed. To accompany: Blood Glucose Monitor Brands: per insurance., Disp: 100 strip, Rfl: 6     blood glucose monitoring (NO BRAND SPECIFIED) meter device kit, Use to test blood sugar 1 times daily or as directed. Preferred blood glucose meter OR supplies to accompany: Blood Glucose Monitor Brands: per insurance., Disp: 1 kit, Rfl: 0     ezetimibe (ZETIA) 10 MG  tablet, Take 1 tablet (10 mg) by mouth daily, Disp: 90 tablet, Rfl: 3     HEMP OIL OR EXTRACT OR OTHER CBD CANNABINOID, NOT MEDICAL CANNABIS,, , Disp: , Rfl:      JANUMET  MG tablet, Take 1 tablet by mouth daily, Disp: 90 tablet, Rfl: 3     losartan (COZAAR) 50 MG tablet, Take 1 tablet (50 mg) by mouth daily, Disp: 90 tablet, Rfl: 3     meloxicam (MOBIC) 7.5 MG tablet, Take 1 tablet (7.5 mg) by mouth daily, Disp: 90 tablet, Rfl: 3     naproxen sodium (ANAPROX) 220 MG tablet, Take 220 mg by mouth 2 times daily (with meals), Disp: , Rfl:      rosuvastatin (CRESTOR) 20 MG tablet, Take 1 tablet (20 mg) by mouth daily, Disp: 90 tablet, Rfl: 3     spironolactone (ALDACTONE) 25 MG tablet, Take 1 tablet (25 mg) by mouth daily, Disp: 90 tablet, Rfl: 3     thin (NO BRAND SPECIFIED) lancets, Use to test blood sugar 1 times daily or as directed. To accompany: Blood Glucose Monitor Brands: per insurance., Disp: 100 each, Rfl: 6    Allergies: Ace inhibitors    SURGICAL HISTORY:   Past Surgical History:   Procedure Laterality Date     JOINT REPLACEMENT         FAMILY HISTORY: No family history on file.    SOCIAL HISTORY:   Social History     Tobacco Use     Smoking status: Never Smoker     Smokeless tobacco: Never Used   Substance Use Topics     Alcohol use: Not Currently       REVIEW OF SYSTEMS:  The comprehensive review of systems from the intake form was reviewed with the patient.  No fever, weight change or fatigue. No dry eyes. No oral ulcers, sore throat or voice change. No palpitations, syncope, angina or edema.  No chest pain, excessive sleepiness, shortness of breath or hemoptysis.   No abdominal pain, nausea, vomiting, diarrhea or heartburn.  No skin rash. No focal weakness or numbness. No bleeding or lymphadenopathy. No rhinitis or hives.     Exam:  On physical examination the patient appears the stated age, is in no acute distress, affectThe is appropriate, and breathing is non-labored.  Vitals are documented in  "the EMR and have been reviewed:    Ht 1.753 m (5' 9\")   Wt 104.3 kg (230 lb)   BMI 33.97 kg/m    5' 9\"  Body mass index is 33.97 kg/m .    Rises from chair: with effort   Gait:forward flexed at the waist   Trendelenburg test:  Gains the exam table: easily     Right  Knee  Appearance: benign  Clinical alignment: neutral   Effusion: no  Tenderness to palpation:  Extension: 0  Flexion:   Collateral ligaments: intact  Cruciate ligaments: grossly intact     Left Knee  Appearance: benign  Clinical alignment: neurtal   Effusion: no  Tenderness to palpation:  Extension:0  Flexion:   Collateral ligaments: intact  Cruciate ligaments: grossly intact     I am not able to reproduce any symptoms with knee examination.   Bilaeral hip ROM is fluid and painless with 95 of flexion and no symptoms with FADIR.  Distally, the circulatory, motor, and sensation exam is intact with 5/5 EHL, gastroc-soleus, and tibialis anterior.  Sensation to light touch is intact.  Dorsalis pedis and posterior tibialis pulses are palpable.  There are no sores on the feet, no bruising, and no lymphedema.    X-rays:   Narrative & Impression     4  views right and left knee radiographs 7/26/2022 8:54 AM     History: Bilateral knee pain; Bilateral knee pain     Additional History from EMR: History of bilateral total knee  arthroplasties, presenting with bilateral knee pain     Comparison: None     Findings:     AP view of bilateral knees, tunnel, lateral and patellofemoral views  of the right and left knee were obtained.      Left:      Postsurgical changes of total knee arthroplasty. Hardware appears  well-seated and intact without evidence of lucency or loosening.     No acute osseous abnormality. Small joint effusion.     Marginal osteophytic spurring of the distal femur and proximal tibia.     Marginal osteophytic spurring of the patella. No patellar tilt or  lateral subluxation.       Mineralization posterior to the knee joint which could represent " joint  bodies versus osseous fragments. Vascular calcifications are noted.      Soft tissue is unremarkable.      Right:      Postsurgical changes of total knee arthroplasty. Hardware appears  well-seated and intact without evidence of loosening or lucency.     No acute osseous abnormality. Small joint effusion.     Marginal osteophytic spurring of the distal femur and proximal tibia.     Marginal osteophytic spurring of the patella. No patellar tilt or  lateral subluxation.      Numerous bodies of mineralization anterior and posterior to the knee  joint as well as superior to the patella visualized on lateral  radiograph, which could represent joint bodies versus osseous  fragments. Vascular calcifications noted.     Soft tissue is unremarkable.                                                                       Impression:  1. Postsurgical changes of bilateral total knee arthroplasties without  evidence of hardware complication.  2. 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     I have personally reviewed the examination and initial interpretation  and I agree with the findings.     YOSHI ROMERO MD (Joe)            Study Result    Narrative & Impression     4  views right and left knee radiographs 7/26/2022 8:54 AM     History: Bilateral knee pain; Bilateral knee pain     Additional History from EMR: History of bilateral total knee  arthroplasties, presenting with bilateral knee pain     Comparison: None     Findings:     AP view of bilateral knees, tunnel, lateral and patellofemoral views  of the right and left knee were obtained.      Left:      Postsurgical changes of total knee arthroplasty. Hardware appears  well-seated and intact without evidence of lucency or loosening.     No acute osseous abnormality. Small joint effusion.     Marginal osteophytic spurring of the distal femur and proximal tibia.     Marginal  osteophytic spurring of the patella. No patellar tilt or  lateral subluxation.       Mineralization posterior to the knee joint which could represent joint  bodies versus osseous fragments. Vascular calcifications are noted.      Soft tissue is unremarkable.      Right:      Postsurgical changes of total knee arthroplasty. Hardware appears  well-seated and intact without evidence of loosening or lucency.     No acute osseous abnormality. Small joint effusion.     Marginal osteophytic spurring of the distal femur and proximal tibia.     Marginal osteophytic spurring of the patella. No patellar tilt or  lateral subluxation.      Numerous bodies of mineralization anterior and posterior to the knee  joint as well as superior to the patella visualized on lateral  radiograph, which could represent joint bodies versus osseous  fragments. Vascular calcifications noted.     Soft tissue is unremarkable.                                                                       Impression:  1. Postsurgical changes of bilateral total knee arthroplasties without  evidence of hardware complication.  2. 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     I have personally reviewed the examination and initial interpretation  and I agree with the findings.     YOSHI ROMERO MD (Joe)

## 2022-07-27 ENCOUNTER — OFFICE VISIT (OUTPATIENT)
Dept: FAMILY MEDICINE | Facility: CLINIC | Age: 82
End: 2022-07-27
Payer: MEDICARE

## 2022-07-27 ENCOUNTER — TELEPHONE (OUTPATIENT)
Dept: NEUROLOGY | Facility: CLINIC | Age: 82
End: 2022-07-27

## 2022-07-27 VITALS
OXYGEN SATURATION: 95 % | HEIGHT: 68 IN | RESPIRATION RATE: 15 BRPM | TEMPERATURE: 97.6 F | WEIGHT: 232.8 LBS | SYSTOLIC BLOOD PRESSURE: 120 MMHG | HEART RATE: 68 BPM | BODY MASS INDEX: 35.28 KG/M2 | DIASTOLIC BLOOD PRESSURE: 72 MMHG

## 2022-07-27 DIAGNOSIS — E66.01 MORBID OBESITY (H): ICD-10-CM

## 2022-07-27 DIAGNOSIS — R46.89 SPELL OF ABNORMAL BEHAVIOR: ICD-10-CM

## 2022-07-27 DIAGNOSIS — R47.89 WORD FINDING DIFFICULTY: Primary | ICD-10-CM

## 2022-07-27 PROCEDURE — 99213 OFFICE O/P EST LOW 20 MIN: CPT | Performed by: NURSE PRACTITIONER

## 2022-07-27 RX ORDER — ALFUZOSIN HYDROCHLORIDE 10 MG/1
10 TABLET, EXTENDED RELEASE ORAL DAILY
COMMUNITY
End: 2022-07-27

## 2022-07-27 RX ORDER — ALBUTEROL SULFATE 90 UG/1
2 AEROSOL, METERED RESPIRATORY (INHALATION) EVERY 6 HOURS
COMMUNITY
End: 2023-04-25

## 2022-07-27 ASSESSMENT — PAIN SCALES - GENERAL: PAINLEVEL: NO PAIN (0)

## 2022-07-27 NOTE — PROGRESS NOTES
"  Assessment & Plan     Word finding difficulty - transient  Symptoms have resolved. Has rare intermittent trouble with word finding. Will refer to neurology. Emergency department precautions discussed.  - Adult Neurology  Referral; Future    Spell of abnormal behavior  - Adult Neurology  Referral; Future    Morbid obesity (H)               BMI:   Estimated body mass index is 35.4 kg/m  as calculated from the following:    Height as of this encounter: 1.727 m (5' 8\").    Weight as of this encounter: 105.6 kg (232 lb 12.8 oz).       See Patient Instructions    Return in about 4 weeks (around 8/24/2022), or if symptoms worsen or fail to improve.     Return precautions discussed, including when to seek urgent/emergent care.    Patient verbalizes understanding and agrees with plan of care. Patient stable for discharge.      ROSALIA PHILLIP CNP  [facility] RAHEEM Griffiths is a 82 year old accompanied by his grand daughter in law, presenting for the following health issues:  Hospital F/U      hospitals       Hospital Follow-up Visit:    Hospital/Nursing Home/ Rehab Facility: [facility]  Date of Admission: 7/12/2022  Date of Discharge: 7/12/2022  Reason(s) for Admission: Possible Stroke    Was your hospitalization related to COVID-19? No   Problems taking medications regularly:  None  Medication changes since discharge: None  Problems adhering to non-medication therapy:  None    Summary of hospitalization:  CareEverywhere information obtained and reviewed  Diagnostic Tests/Treatments reviewed.  Follow up needed: none  Other Healthcare Providers Involved in Patient s Care:         None  Update since discharge: improved. Post Medication Reconciliation Status:        Plan of care communicated with patient and family         7/11 went to dentist and had novocaine. No issues immediately after. Was feeling very anxious about procedure  7/12 had some word finding " trouble and short term memory loss  7/13 felt like had scotch tape on his fingers on the right hand. Has been applying pain relieving spray since then  Family went through meds and realized he wasn't taking losartan for few weeks (it arrived in the mail and got moved from original location)  Still with intermittent word finding difficulty   Short term memory is better  Physically at baseline        Mynor White MD - 07/12/2022 1:01 PM CDT  Formatting of this note is different from the original.  Images from the original note were not included.  CHIEF COMPLAINT:  Stroke Like Symptoms    HPI:  Initial history obtained at 1:01 PM 07/12/22.     Tunde Gutierrez is a 82 y.o. male with a history of type 2 diabetes mellitus, hyperlipidemia, hypertension, COVID-19, and CKD stage 3 who presents to the emergency department with his wife and daughter for evaluation of stroke like symptoms. The patient went to his dentist yesterday and had a Novocain shot. He felt well prior to the dentist appointment and afterwards the dentist noticed no abnormalities. When he got home the patient was very tired and took a nap which was unusual for him. The patient's family attributed this to his lack of sleep because of his nervousness regarding the dentist appointment. This morning the family note that the patient was acting odd. He normally sits and watches television most of the morning, but today he was walking around the house for no apparent reason. His wife also notes that he seemed to have some difficulty getting words out and would occasionally slur his speech. The patient's family became concerned so they brought him into the ED. Here in the ED the patient is much improved. He repots a minor headache which he has had for a couple days. He denies facial asymmetry, numbness, syncope, weakness, or chest pain. The patient walks with a cane at baseline and has had no trouble ambulating.    MEDICATIONS:   The patient  takes:  albuterol HFA (PROVENTIL;VENTOLIN HFA) 90 mcg/actuation Inhl inhaler   alfuzosin (UROXATRAL) 10 mg oral extended release tablet 24 HR   ezetimibe (ZETIA) 10 mg oral tablet   FREESTYLE LITE STRIPS Strip testing strips   JANUMET 50-1,000 mg oral tablet   losartan (COZAAR) 50 mg oral tablet   meloxicam (MOBIC) 7.5 mg oral tablet   rosuvastatin (CRESTOR) 20 mg oral tablet   spironolactone (ALDACTONE) 25 mg oral tablet     ALLERGIES:   The patient has allergies of:  Ace Inhibitors    PAST MEDICAL HISTORY:   The patient has a history of:  Arthritis  CKD stage 3  COVID-19  Diabetic retinopathy  Hyperlipidemia  Hypertension  Obesity  Type 2 diabetes mellitus    PAST SURGICAL HISTORY:   The patient has past surgeries of:  Total Hip Arthroplasty-Right  Total Knee Arthroplasty-Bilateral  Vitrectomy  Yag Capsulotomy  Cataract Removal-Bilateral    SOCIAL HISTORY:   The patient presents with his wife and daughter.  He denies any tobacco use.    REVIEW OF SYSTEMS:   Review of Systems   Constitutional: Positive for fatigue.   Cardiovascular: Negative for chest pain.   Neurological: Positive for speech difficulty (Slurred speech) and headaches. Negative for syncope, facial asymmetry, weakness and numbness.   All other systems reviewed and are negative.    PHYSICAL EXAM:   Physical Exam  Temperature: 99  F (37.2  C) HR: 67 Respirations: 18 BP: 134/71 SpO2: 97 %  Constitutional: Sitting in bedside wheelchair with cane in lap, appears comfortable.  Eyes: Normal EOM. Pupils are equal, round, and reactive to light. Normal conjunctiva.  ENT: Normal external ears and nose.   Cardiovascular: Normal rate and regular rhythm. Peripheral pulses normal in upper extremities.  Respiratory: Normal effort. Clear to auscultation bilaterally.  GI: Abdomen soft, nontender, nondistended, with no guarding. Normal bowel sounds.  Musculoskeletal: No deformities. No edema or tenderness. Normal tone.   Neurological: Symmetric face and normal face  exercises. Alert and oriented to person, place, time, situation. 5/5 dorsi-/plantarflexion of bilateral ankles, 5/5 dorsiflexion of bilat great toes. 5/5 bilat hip flexion and knee extension. Good  strength, elbow flexion/extension. CN grossly intact. EOM normal without nystagmus.  Skin: Skin is warm and dry.   Psychiatric: Normal mood and affect, normal behavior    ED COURSE:  EKG:  (1205 Hours):  Indication: Stroke Like Symptoms   Ventricular Rate: 66   QRS Axis: 30   Intervals: , QRSD 119, QTc 448   Interpretation:   Sinus rhythm  Moderate intraventricular conduction delay  Nonspecific ST & T-wave abnormality    Laboratory:  Labs Reviewed   CBC/DIFF - Abnormal; Notable for the following components:   Result Value   RBC 4.07 (*)   HEMOGLOBIN 12.9 (*)   HEMATOCRIT 38.5 (*)   All other components within normal limits   BASIC METAB PROFILE - Abnormal; Notable for the following components:   CREATININE 1.43 (*)   EST GFR (CKD-EPI) 48.32 (*)   GLUCOSE 121 (*)   All other components within normal limits   POCT GLU METER (LAB USE ONLY) - Abnormal; Notable for the following components:   GLUCOSE WB METER 130 (*)   All other components within normal limits   EXTRA TUBE-SST (LAB USE ONLY)   EXTRA TUBE PST (LAB USE ONLY)   EXTRA TUBE-EDTA (LAB USE ONLY)     Imaging:  MRI BRAIN WITHOUT CONTRAST   Final Result   IMPRESSION:     Age-related involutional findings. No acute intracranial abnormality.     REPORT SIGNED BY DR. Justice Ag       Notable Events:  Reviewed:  12:56: I reviewed the patient's vital signs, past medical history, previous medical charts and nursing notes.    Assessments:  1:01 PM: I performed initial history and physical exam of the patient.    I re-evaluated the patient.    ED Vitals:  Patient Vitals for the past 24 hrs:  BP Temp Pulse Resp SpO2   07/12/22 1315 (!) 130/44 -- 65 -- 96 %   07/12/22 1154 134/71 99  F (37.2  C) 67 18 97 %     MDM:   Tunde Gutierrez is a 82 y.o. male who presents  today with altered verbal output this morning. The patient states that he had a hard time getting his words out, wife and daughter at bedside confirmed that for a couple hours this morning he perhaps had mild slurring his speech, but had a difficult time choosing his words. He had no weakness in his extremities, no problems with balance, no other symptoms. They do admit some reduced sleep associate with anxiety about a dental procedure which occurred yesterday; there was no concerns about any neurologic deficit yesterday. The patient appears clinically well on examination, vitals normal. Detailed neuro examination normal with no evidence of any sort of deficit. Face is symmetric, no droop, no exam findings currently of any sort of deficit. The patient ambulated with his walker here at baseline as confirmed by family. Symptoms may represent TIA, stroke, nonspecific/functional/transient word finding difficulty. Laboratory work-up obtained as above and unremarkable. MRI brain obtained and negative. Discussed with patient family that this presentation potentially could represent TIA, as he does have some risk factors for vascular disease, though it is not clear that his vague symptoms this morning are concerning enough to warrant hospitalization. He is on adequate treatment for vascular risk factor modification, and has reliable outpatient follow-up. I did offer hospital observation for neurology consultation and consideration of additional work-up emergently, patient and family prefer to follow-up closely in the outpatient setting. I think this is reasonable though they understand that follow-up should occur over the next week. Patient and family pleased with this plan, we discussed return precautions in detail and they are comfortable with discharge plan. Questions solicited and answered.    DIAGNOSIS:  ICD-10-CM   1. Spell of abnormal behavior R46.89   2. Transient word finding difficulty R47.89     DISPOSITION:  "  Discharge    Discharge Medication List as of 7/12/2022 1:33 PM       ATTESTATION:  Scribe Attestation:  I, Jasson Varela, am serving as a scribe to document services personally performed by Mynor White MD, based on my observations and the provider's statements to me.    Provider Attestation:  Portions of this medical record were completed by a scribe. UPON MY REVIEW AND AUTHENTICATION BY ELECTRONIC SIGNATURE, this confirms (a) I performed the applicable clinical services, and (b) the record is accurate.    Mynor White MD  1:01 PM  07/12/22   [facility]  Electronically signed by Mynor White MD at 07/12/2022 5:28 PM CDT      Review of Systems   Constitutional, HEENT, cardiovascular, pulmonary, gi and gu systems are negative, except as otherwise noted.      Objective    /72 (BP Location: Right arm, Patient Position: Sitting, Cuff Size: Adult Large)   Pulse 68   Temp 97.6  F (36.4  C) (Oral)   Resp 15   Ht 1.727 m (5' 8\")   Wt 105.6 kg (232 lb 12.8 oz)   SpO2 95%   BMI 35.40 kg/m    Body mass index is 35.4 kg/m .  Physical Exam   GENERAL: healthy, alert and no distress. Seated in wheelchair  RESP: lungs clear to auscultation - no rales, rhonchi or wheezes  CV: regular rate and rhythm, normal S1 S2, no S3 or S4, no murmur, click or rub  NEURO: Normal strength and tone, mentation intact and speech normal. CN II-VII intact. No facial droop. BUE/BLE strength normal, 5+ throughout.  PSYCH: mentation appears normal, affect normal/bright                    .  ..  "

## 2022-07-27 NOTE — TELEPHONE ENCOUNTER
Health Call Center    Phone Message    May a detailed message be left on voicemail: yes     Reason for Call: Appointment Intake    Referring Provider Name: Marisabel Mosley  Diagnosis and/or Symptoms:  Word finding difficulty [R47.89]  Spell of abnormal behavior [R46.89]    Per Dr. Emerson please schedule patient 1-2 weeks, patient is scheduled for 1/17/23 and added to waitlist, please assist with scheduling a sooner appt.    Action Taken: Other: [facility]    Travel Screening: Not Applicable

## 2022-07-27 NOTE — PATIENT INSTRUCTIONS
At [facility], we strive to deliver an exceptional experience to you, every time we see you. If you receive a survey, please complete it as we do value your feedback.  If you have MyChart, you can expect to receive results automatically within 24 hours of their completion.  Your provider will send a note interpreting your results as well.   If you do not have MyChart, you should receive your results in about a week by mail.    Your care team:                            Family Medicine Internal Medicine   MD John Sanchez MD Shantel Branch-Fleming, MD Srinivasa Vaka, MD Katya Belousova, ROSALIA White CNP, MD ([facility]   Slim Whitman, MD Latha Boyd MD Amelia Massimini APRN CNP Kim Thein, APRN CNP Bethany Templen, MD             Clinic hours: Monday - Thursday 7 am-6 pm; Fridays 7 am-5 pm.   Urgent care: Monday - Friday 10 am- 8 pm; Saturday and Sunday 9 am-5 pm.    Clinic: [phone]       [facility]: Monday - Thursday 8 am - 7 pm; Friday 8 am - 6 pm  [facility]: [phone]

## 2022-07-28 NOTE — TELEPHONE ENCOUNTER
Pt notified that earlier neurology appt available. Pt and spouse would like to be scheduled for earlier appt on 810/22 at 12:45pm. Pt scheduled and informed to check-in at desk C for his appt.       Lorena Sigala, RNCC  Neurology/Neurosurgery/PM&R

## 2022-07-28 NOTE — TELEPHONE ENCOUNTER
RECORDS RECEIVED FROM: Internal   REASON FOR VISIT:   Word finding difficulty   Spell of abnormal behavior      Date of Appt: 08/10/2022   NOTES (FOR ALL VISITS) STATUS DETAILS   OFFICE NOTE from referring provider Internal 07/27/2022 Dr Emerson [facility]    OFFICE NOTE from other specialist N/A    DISCHARGE SUMMARY from hospital N/A    DISCHARGE REPORT from the ER Care Everywhere 07/12/2022 [place] [facility]  04/16/2020 [facility]   OPERATIVE REPORT N/A    MEDICATION LIST N/A    IMAGING  (FOR ALL VISITS)     EMG N/A    EEG N/A    LUMBAR PUNCTURE N/A    FRANTZ SCAN N/A    ULTRASOUND (CAROTID BILAT) *VASCULAR* N/A    MRI (HEAD, NECK, SPINE) Received 07/12/2022 brain   CT (HEAD, NECK, SPINE) Received 04/17/2020 head brain      Images in PACS

## 2022-08-10 ENCOUNTER — PRE VISIT (OUTPATIENT)
Dept: NEUROLOGY | Facility: CLINIC | Age: 82
End: 2022-08-10

## 2022-08-10 ENCOUNTER — OFFICE VISIT (OUTPATIENT)
Dept: NEUROLOGY | Facility: CLINIC | Age: 82
End: 2022-08-10
Attending: NURSE PRACTITIONER
Payer: MEDICARE

## 2022-08-10 VITALS
HEART RATE: 69 BPM | SYSTOLIC BLOOD PRESSURE: 132 MMHG | HEIGHT: 68 IN | BODY MASS INDEX: 35.16 KG/M2 | WEIGHT: 232 LBS | DIASTOLIC BLOOD PRESSURE: 72 MMHG

## 2022-08-10 DIAGNOSIS — G45.9 TIA (TRANSIENT ISCHEMIC ATTACK): Primary | ICD-10-CM

## 2022-08-10 DIAGNOSIS — R47.89 WORD FINDING DIFFICULTY: ICD-10-CM

## 2022-08-10 DIAGNOSIS — R46.89 SPELL OF ABNORMAL BEHAVIOR: ICD-10-CM

## 2022-08-10 PROCEDURE — 99205 OFFICE O/P NEW HI 60 MIN: CPT | Performed by: INTERNAL MEDICINE

## 2022-08-10 RX ORDER — NIACIN 500 MG
500 TABLET ORAL
COMMUNITY
End: 2023-04-25

## 2022-08-10 RX ORDER — ASPIRIN 81 MG/1
81 TABLET ORAL DAILY
COMMUNITY

## 2022-08-10 ASSESSMENT — PAIN SCALES - GENERAL: PAINLEVEL: SEVERE PAIN (6)

## 2022-08-10 NOTE — PROGRESS NOTES
[facility] Neurology Consultation    Tunde Gutierrez MRN# [id number]   Age: 82 year old YOB: 1940     Requesting physician: Dylan Fischer     Reason for Consultation: episode of difficulty speaking      History of Presenting Symptoms:   Tunde Gutierrez is a 82 year old male who presents today for evaluation of episode of difficulty speaking.     He had dental crown placed on July 11th. He was really nervous before the dental procedure and didn't sleep well in the preceding days. During the procedure he remembers having a hard speaking and thinking and saying what he wanted to say. He had no problems understanding what people were saying. The dentist afterwards said he noticed this, but patient also had his mouth being worked on at the time. He was given novocain, but no other medications during the procedure. He was not given any oral sedatives.     When he got home he told his wife about the difficulty speaking and had a hard time explaining it. When he tried to take he had some problems finding the correct words. His speech was never complete gibberish or unintelligible. Patient was brought to the ER the next day. There was mention in the notes of some possible slurred speech the day before. MRI brain was completed in the ER which did not show an acute stroke. He was feeling closer to baseline when he left the ER. Since then he has noticed some possible word finding difficulties at times. He denies any short term memory concerns. Family has not allowed him to drive since the incident. Also since the incident patient has noticed some numbness in the 2-4 digits of the right hand.          Past Medical History:     Patient Active Problem List   Diagnosis     Chronic constipation     Diabetes mellitus, type II (H)     Diabetic retinopathy without macular edema associated with diabetes mellitus due to underlying condition (H)     Hypertension     Morbid obesity (H)      Hyperlipidemia     Past Medical History:   Diagnosis Date     Diabetes mellitus, type 2 (H)         Past Surgical History:     Past Surgical History:   Procedure Laterality Date     JOINT REPLACEMENT          Social History:     Social History     Tobacco Use     Smoking status: Never Smoker     Smokeless tobacco: Never Used   Vaping Use     Vaping Use: Never used   Substance Use Topics     Alcohol use: Not Currently        Family History:   No family history on file.     Medications:     Current Outpatient Medications   Medication Sig     acetaminophen (TYLENOL) 500 MG tablet Take 500-1,000 mg by mouth every 6 hours as needed for mild pain     albuterol (PROAIR HFA/PROVENTIL HFA/VENTOLIN HFA) 108 (90 Base) MCG/ACT inhaler Inhale 2 puffs into the lungs every 6 hours     alcohol swab prep pads Use to swab area of injection/nina as directed.     alfuzosin ER (UROXATRAL) 10 MG 24 hr tablet Take 1 tablet (10 mg) by mouth daily     amoxicillin (AMOXIL) 500 MG capsule TAKE 4 CAPSULES BY MOUTH 1 HOUR PRIOR TO APPT     blood glucose (NO BRAND SPECIFIED) test strip Use to test blood sugar 1 times daily or as directed. To accompany: Blood Glucose Monitor Brands: per insurance.     blood glucose monitoring (NO BRAND SPECIFIED) meter device kit Use to test blood sugar 1 times daily or as directed. Preferred blood glucose meter OR supplies to accompany: Blood Glucose Monitor Brands: per insurance.     ezetimibe (ZETIA) 10 MG tablet Take 1 tablet (10 mg) by mouth daily     JANUMET  MG tablet Take 1 tablet by mouth daily     losartan (COZAAR) 50 MG tablet Take 1 tablet (50 mg) by mouth daily     meloxicam (MOBIC) 7.5 MG tablet Take 1 tablet (7.5 mg) by mouth daily (Patient not taking: Reported on 7/27/2022)     rosuvastatin (CRESTOR) 20 MG tablet Take 1 tablet (20 mg) by mouth daily     spironolactone (ALDACTONE) 25 MG tablet Take 1 tablet (25 mg) by mouth daily     thin (NO BRAND SPECIFIED) lancets Use to test blood sugar  "1 times daily or as directed. To accompany: Blood Glucose Monitor Brands: per insurance.     No current facility-administered medications for this visit.        Allergies:     Allergies   Allergen Reactions     Ace Inhibitors Cough        Review of Systems:   As above     Physical Exam:   Vitals: /72   Pulse 69   Ht 1.727 m (5' 8\")   Wt 105.2 kg (232 lb)   BMI 35.28 kg/m     General: Seated comfortably in no acute distress.  Lungs: breathing comfortably  Extremities: no edema  Skin: No rashes  Neurologic:     Mental Status: MoCA 17/30 (-1 trails, -1 copy cube, -1 clock hands, -1 naming animals, -1 repeating sentence, -1 fluency, -4 delayed recall, -1 date, -1 month (later corrected himself)).     Cranial Nerves: Visual fields intact. PERRL. EOMI with normal smooth pursuit. Facial sensation intact/symmetric. Facial movements symmetric. Hearing not formally tested but intact to conversation. Palate elevation symmetric, uvula midline. No dysarthria. Shoulder shrug strong bilaterally. Tongue protrusion midline.     Motor: No tremors or other abnormal movements observed. Muscle tone normal throughout. Possible subtle right arm drift. Normal/symmetric rapid finger tapping. Strength 5/5 throughout upper and lower extremities as below.      Right Left   Shoulder abduction        5 5   Elbow extension 5 5   Elbow flexion 5 5   Wrist extension         5 5   Finger extension 5 5   ADM 5 5   FDI 5 5   APB 5 5   Hip flexion 5 5   Knee flexion 5 5   Knee extension 5 5   Dorsiflexion 5 5   Plantar flexion 5 5        Deep Tendon Reflexes: 1+/symmetric throughout upper and lower extremities. No clonus.      Sensory: Decreased sensation to light touch in finger tips of right 2-4 digits, otherwise intact to light touch throughout upper and lower extremities. Sways with Romberg, but doesn't fall.      Coordination: Finger-nose-finger and heel-shin intact without dysmetria. Rapid alternating movements intact/symmetric with " normal speed and rhythm.     Gait: Antalgic and slow casual gait with use of cane.          Data: Pertinent prior to visit   Imaging:  MRI brain 7/2022  IMPRESSION:   Age-related involutional findings. No acute intracranial abnormality.   Personally reviewed and agree with above impression    Procedures:  EKG 7/2022  SINUS RHYTHM     Laboratory:  LDL 55, A1c 7.0 (5/2022)         Assessment and Plan:   Assessment:  Tunde Gutierrez is a 82 year old male who presents today for evaluation of episode of difficulty speaking. Episode in question occurred during a dental procedure on 7/11. He had some issues talking to a milder degree afterwards and was brought to the ER. MRI brain did not show an acute stroke. He reports some mild word finding difficulties since then. It is possible that episode was related to a TIA. CTA head/neck and TTE ordered to complete TIA work-up. He should continue aspirin and statin as he is doing.     MoCA was 17/30 as above. Patient was notably nervous during testing and I suspect some of deficits were related to inattention because of nervousness. Cerebrovascular disease is likely causing some cognitive impairment and we discussed importance of continued vascular risk factor control. Family has opted to restrict driving because of some concerns over safety of driving. Patient is ok with stopping driving. He could be reassessed in the future if there is worsening in cognition.      Plan:  - CTA head/neck  - TTE   - Continue aspirin and statin for stroke prevention    Follow up in Neurology clinic as needed should new concerns arise.    Catarino Taveras MD   of Neurology  [facility]      The total time of this encounter today amounted to 80 minutes. This time included time spent with the patient, prep work, ordering tests, and performing post visit documentation.

## 2022-08-10 NOTE — LETTER
8/10/2022         RE: Tunde Gutierrez  [address]  [place] [address]        Dear Colleague,    Thank you for referring your patient, Tunde Gutierrez, to the [facility] NEUROLOGY CLINIC [place]. Please see a copy of my visit note below.    [facility] Neurology Consultation    Tunde Gutierrez MRN# [id number]   Age: 82 year old YOB: 1940     Requesting physician: Dylan Fischer     Reason for Consultation: episode of difficulty speaking      History of Presenting Symptoms:   Tunde Gutierrez is a 82 year old male who presents today for evaluation of episode of difficulty speaking.     He had dental crown placed on July 11th. He was really nervous before the dental procedure and didn't sleep well in the preceding days. During the procedure he remembers having a hard speaking and thinking and saying what he wanted to say. He had no problems understanding what people were saying. The dentist afterwards said he noticed this, but patient also had his mouth being worked on at the time. He was given novocain, but no other medications during the procedure. He was not given any oral sedatives.     When he got home he told his wife about the difficulty speaking and had a hard time explaining it. When he tried to take he had some problems finding the correct words. His speech was never complete gibberish or unintelligible. Patient was brought to the ER the next day. There was mention in the notes of some possible slurred speech the day before. MRI brain was completed in the ER which did not show an acute stroke. He was feeling closer to baseline when he left the ER. Since then he has noticed some possible word finding difficulties at times. He denies any short term memory concerns. Family has not allowed him to drive since the incident. Also since the incident patient has noticed some numbness in the 2-4 digits of the right hand.          Past Medical History:      Patient Active Problem List   Diagnosis     Chronic constipation     Diabetes mellitus, type II (H)     Diabetic retinopathy without macular edema associated with diabetes mellitus due to underlying condition (H)     Hypertension     Morbid obesity (H)     Hyperlipidemia     Past Medical History:   Diagnosis Date     Diabetes mellitus, type 2 (H)         Past Surgical History:     Past Surgical History:   Procedure Laterality Date     JOINT REPLACEMENT          Social History:     Social History     Tobacco Use     Smoking status: Never Smoker     Smokeless tobacco: Never Used   Vaping Use     Vaping Use: Never used   Substance Use Topics     Alcohol use: Not Currently        Family History:   No family history on file.     Medications:     Current Outpatient Medications   Medication Sig     acetaminophen (TYLENOL) 500 MG tablet Take 500-1,000 mg by mouth every 6 hours as needed for mild pain     albuterol (PROAIR HFA/PROVENTIL HFA/VENTOLIN HFA) 108 (90 Base) MCG/ACT inhaler Inhale 2 puffs into the lungs every 6 hours     alcohol swab prep pads Use to swab area of injection/nina as directed.     alfuzosin ER (UROXATRAL) 10 MG 24 hr tablet Take 1 tablet (10 mg) by mouth daily     amoxicillin (AMOXIL) 500 MG capsule TAKE 4 CAPSULES BY MOUTH 1 HOUR PRIOR TO APPT     blood glucose (NO BRAND SPECIFIED) test strip Use to test blood sugar 1 times daily or as directed. To accompany: Blood Glucose Monitor Brands: per insurance.     blood glucose monitoring (NO BRAND SPECIFIED) meter device kit Use to test blood sugar 1 times daily or as directed. Preferred blood glucose meter OR supplies to accompany: Blood Glucose Monitor Brands: per insurance.     ezetimibe (ZETIA) 10 MG tablet Take 1 tablet (10 mg) by mouth daily     JANUMET  MG tablet Take 1 tablet by mouth daily     losartan (COZAAR) 50 MG tablet Take 1 tablet (50 mg) by mouth daily     meloxicam (MOBIC) 7.5 MG tablet Take 1 tablet (7.5 mg) by mouth daily  "(Patient not taking: Reported on 7/27/2022)     rosuvastatin (CRESTOR) 20 MG tablet Take 1 tablet (20 mg) by mouth daily     spironolactone (ALDACTONE) 25 MG tablet Take 1 tablet (25 mg) by mouth daily     thin (NO BRAND SPECIFIED) lancets Use to test blood sugar 1 times daily or as directed. To accompany: Blood Glucose Monitor Brands: per insurance.     No current facility-administered medications for this visit.        Allergies:     Allergies   Allergen Reactions     Ace Inhibitors Cough        Review of Systems:   As above     Physical Exam:   Vitals: /72   Pulse 69   Ht 1.727 m (5' 8\")   Wt 105.2 kg (232 lb)   BMI 35.28 kg/m     General: Seated comfortably in no acute distress.  Lungs: breathing comfortably  Extremities: no edema  Skin: No rashes  Neurologic:     Mental Status: MoCA 17/30 (-1 trails, -1 copy cube, -1 clock hands, -1 naming animals, -1 repeating sentence, -1 fluency, -4 delayed recall, -1 date, -1 month (later corrected himself)).     Cranial Nerves: Visual fields intact. PERRL. EOMI with normal smooth pursuit. Facial sensation intact/symmetric. Facial movements symmetric. Hearing not formally tested but intact to conversation. Palate elevation symmetric, uvula midline. No dysarthria. Shoulder shrug strong bilaterally. Tongue protrusion midline.     Motor: No tremors or other abnormal movements observed. Muscle tone normal throughout. Possible subtle right arm drift. Normal/symmetric rapid finger tapping. Strength 5/5 throughout upper and lower extremities as below.      Right Left   Shoulder abduction        5 5   Elbow extension 5 5   Elbow flexion 5 5   Wrist extension         5 5   Finger extension 5 5   ADM 5 5   FDI 5 5   APB 5 5   Hip flexion 5 5   Knee flexion 5 5   Knee extension 5 5   Dorsiflexion 5 5   Plantar flexion 5 5        Deep Tendon Reflexes: 1+/symmetric throughout upper and lower extremities. No clonus.      Sensory: Decreased sensation to light touch in finger " tips of right 2-4 digits, otherwise intact to light touch throughout upper and lower extremities. Sways with Romberg, but doesn't fall.      Coordination: Finger-nose-finger and heel-shin intact without dysmetria. Rapid alternating movements intact/symmetric with normal speed and rhythm.     Gait: Antalgic and slow casual gait with use of cane.          Data: Pertinent prior to visit   Imaging:  MRI brain 7/2022  IMPRESSION:   Age-related involutional findings. No acute intracranial abnormality.   Personally reviewed and agree with above impression    Procedures:  EKG 7/2022  SINUS RHYTHM     Laboratory:  LDL 55, A1c 7.0 (5/2022)         Assessment and Plan:   Assessment:  Tunde Gutierrez is a 82 year old male who presents today for evaluation of episode of difficulty speaking. Episode in question occurred during a dental procedure on 7/11. He had some issues talking to a milder degree afterwards and was brought to the ER. MRI brain did not show an acute stroke. He reports some mild word finding difficulties since then. It is possible that episode was related to a TIA. CTA head/neck and TTE ordered to complete TIA work-up. He should continue aspirin and statin as he is doing.     MoCA was 17/30 as above. Patient was notably nervous during testing and I suspect some of deficits were related to inattention because of nervousness. Cerebrovascular disease is likely causing some cognitive impairment and we discussed importance of continued vascular risk factor control. Family has opted to restrict driving because of some concerns over safety of driving. Patient is ok with stopping driving. He could be reassessed in the future if there is worsening in cognition.      Plan:  - CTA head/neck  - TTE   - Continue aspirin and statin for stroke prevention    Follow up in Neurology clinic as needed should new concerns arise.    Catarino Taveras MD   of Neurology  [facility]      The total time of  this encounter today amounted to 80 minutes. This time included time spent with the patient, prep work, ordering tests, and performing post visit documentation.        Again, thank you for allowing me to participate in the care of your patient.        Sincerely,        Catarino Taveras MD

## 2022-08-12 ENCOUNTER — ANCILLARY PROCEDURE (OUTPATIENT)
Dept: GENERAL RADIOLOGY | Facility: CLINIC | Age: 82
End: 2022-08-12
Attending: FAMILY MEDICINE
Payer: MEDICARE

## 2022-08-12 ENCOUNTER — PRE VISIT (OUTPATIENT)
Dept: ORTHOPEDICS | Facility: CLINIC | Age: 82
End: 2022-08-12

## 2022-08-12 ENCOUNTER — OFFICE VISIT (OUTPATIENT)
Dept: ORTHOPEDICS | Facility: CLINIC | Age: 82
End: 2022-08-12
Payer: MEDICARE

## 2022-08-12 DIAGNOSIS — M54.2 CERVICAL PAIN: ICD-10-CM

## 2022-08-12 DIAGNOSIS — M54.50 LOW BACK PAIN: ICD-10-CM

## 2022-08-12 DIAGNOSIS — M54.50 CHRONIC BILATERAL LOW BACK PAIN, UNSPECIFIED WHETHER SCIATICA PRESENT: Primary | ICD-10-CM

## 2022-08-12 DIAGNOSIS — G89.29 CHRONIC BILATERAL LOW BACK PAIN, UNSPECIFIED WHETHER SCIATICA PRESENT: Primary | ICD-10-CM

## 2022-08-12 PROCEDURE — 99203 OFFICE O/P NEW LOW 30 MIN: CPT | Performed by: FAMILY MEDICINE

## 2022-08-12 PROCEDURE — 72100 X-RAY EXAM L-S SPINE 2/3 VWS: CPT | Performed by: STUDENT IN AN ORGANIZED HEALTH CARE EDUCATION/TRAINING PROGRAM

## 2022-08-12 NOTE — LETTER
8/12/2022         RE: Tunde Gutierrez  [address]  [place] [address]        Dear Colleague,    Thank you for referring your patient, Tunde Gutierrez, to the [facility]. Please see a copy of my visit note below.      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  Likely has lumbar spinal stenosis based on symptoms and appearance of x-rays.    Reviewed imaging and assessment with patient in detail  Discussed options for treatment and the patient is interested in a course of physical therapy.  Patient is currently homebound and unable to leave the home on his own.  Therefore we will pursue a course of home therapy if possible.  If ineffective may consider MRI of the lumbar spine as next step in treatment.      Mundo Kurtz MD  [facility]    -----  Chief Complaint   Patient presents with     Consult For     Neck and low back        SUBJECTIVE  Tunde Gutierrez is a/an 82 year old male who is seen as a self referral for evaluation of neck and low back pain.     The patient is seen with their son.  The patient is Right handed    Onset: 1-2 month(s) ago. Reports insidious onset without acute precipitating event.  Location of Pain: Low back pain with some pain and reports some generalized weakness in the lower extremities.  Neck pain is mostly centralized to the cervical spine and paraspinous area.  He does have some stiffness and intermittent tingling in the hands but it is not clear if this is related.  Worsened by: Worse with standing for longer periods for standing or sitting for too long.  Better with: biofreeze when using but then it comes back; PT many years ago; chiropractic care   Treatments tried: physical therapy (has not been for many years), chiropractic care, casting/splinting/bracing for  wrist, acupuncture; biofreeze  Associated symptoms: numbness and tingling in the hands    Orthopedic/Surgical history: NO  Social History/Occupation: Retired      REVIEW OF SYSTEMS:    Do you have fever, chills, weight loss? No    Do you have any vision problems? No    Do you have any chest pain or edema? No    Do you have any shortness of breath or wheezing?  No    Do you have stomach problems? No    Do you have any numbness or focal weakness? No    Do you have diabetes? Yes     Do you have problems with bleeding or clotting? No    Do you have an rashes or other skin lesions? No    OBJECTIVE:  There were no vitals taken for this visit.     General: alert, pleasant, no distress. sitting comfortably in chair  Back/Spine: no tenderness to palpation of spinous processes, or paraspinous musculature of lumbar spine.  Very limited ROM.  Mild pain with extension. Straight leg raise negative bilaterally.   Neuro: SILT on bilateral lower extremities, can stand on toes and heel walk without difficulty, strength otherwise intact based on manual muscle testing in the lower extremities      Neck/Spine: no TTP of spinous processes in cervical spine.  Has pain with extension and rotation of the neck.  Limited ROM.  positive  TTP along paraspinous muscles and upper trapezius musculature. negative Periscapular pain.  negative tightness in this area. No noted bruising or skin discoloration. Spurlings negative  Neurologic: SILT throughout upper extremities. Strength 5/5 and symmetric throughout upper extremities.       RADIOLOGY:    2 view xrays of lumbar spine performed and reviewed independently demonstrating multilevel severe degenerative change with severe disc height loss at L4-5.  Lower lumbar facet arthropathy is prominent.  No acute findings. See EMR for formal radiology report.     2 view x-rays of the cervical spine are performed and reviewed independently demonstrating   Multilevel degenerative disease with facet  arthropathy.  No acute findings.  No significant listhesis.  See EMR for formal radiology report.          Again, thank you for allowing me to participate in the care of your patient.        Sincerely,        Mundo Kurtz MD

## 2022-08-12 NOTE — PROGRESS NOTES
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  Likely has lumbar spinal stenosis based on symptoms and appearance of x-rays.    Reviewed imaging and assessment with patient in detail  Discussed options for treatment and the patient is interested in a course of physical therapy.  Patient is currently homebound and unable to leave the home on his own.  Therefore we will pursue a course of home therapy if possible.  If ineffective may consider MRI of the lumbar spine as next step in treatment.      Mundo Kurtz MD  [facility] SPORTS MEDICINE [facility]    -----  Chief Complaint   Patient presents with     Consult For     Neck and low back        SUBJECTIVE  Tunde Gutierrez is a/an 82 year old male who is seen as a self referral for evaluation of neck and low back pain.     The patient is seen with their son.  The patient is Right handed    Onset: 1-2 month(s) ago. Reports insidious onset without acute precipitating event.  Location of Pain: Low back pain with some pain and reports some generalized weakness in the lower extremities.  Neck pain is mostly centralized to the cervical spine and paraspinous area.  He does have some stiffness and intermittent tingling in the hands but it is not clear if this is related.  Worsened by: Worse with standing for longer periods for standing or sitting for too long.  Better with: biofreeze when using but then it comes back; PT many years ago; chiropractic care   Treatments tried: physical therapy (has not been for many years), chiropractic care, casting/splinting/bracing for wrist, acupuncture; biofreeze  Associated symptoms: numbness and tingling in the hands    Orthopedic/Surgical history: NO  Social History/Occupation: Retired      REVIEW OF SYSTEMS:    Do you have fever, chills, weight loss? No    Do you have any vision problems? No    Do you  have any chest pain or edema? No    Do you have any shortness of breath or wheezing?  No    Do you have stomach problems? No    Do you have any numbness or focal weakness? No    Do you have diabetes? Yes     Do you have problems with bleeding or clotting? No    Do you have an rashes or other skin lesions? No    OBJECTIVE:  There were no vitals taken for this visit.     General: alert, pleasant, no distress. sitting comfortably in chair  Back/Spine: no tenderness to palpation of spinous processes, or paraspinous musculature of lumbar spine.  Very limited ROM.  Mild pain with extension. Straight leg raise negative bilaterally.   Neuro: SILT on bilateral lower extremities, can stand on toes and heel walk without difficulty, strength otherwise intact based on manual muscle testing in the lower extremities      Neck/Spine: no TTP of spinous processes in cervical spine.  Has pain with extension and rotation of the neck.  Limited ROM.  positive  TTP along paraspinous muscles and upper trapezius musculature. negative Periscapular pain.  negative tightness in this area. No noted bruising or skin discoloration. Spurlings negative  Neurologic: SILT throughout upper extremities. Strength 5/5 and symmetric throughout upper extremities.       RADIOLOGY:    2 view xrays of lumbar spine performed and reviewed independently demonstrating multilevel severe degenerative change with severe disc height loss at L4-5.  Lower lumbar facet arthropathy is prominent.  No acute findings. See EMR for formal radiology report.     2 view x-rays of the cervical spine are performed and reviewed independently demonstrating   Multilevel degenerative disease with facet arthropathy.  No acute findings.  No significant listhesis.  See EMR for formal radiology report.

## 2022-08-24 ENCOUNTER — ANCILLARY PROCEDURE (OUTPATIENT)
Dept: CARDIOLOGY | Facility: CLINIC | Age: 82
End: 2022-08-24
Attending: INTERNAL MEDICINE
Payer: MEDICARE

## 2022-08-24 ENCOUNTER — ANCILLARY PROCEDURE (OUTPATIENT)
Dept: CT IMAGING | Facility: CLINIC | Age: 82
End: 2022-08-24
Attending: INTERNAL MEDICINE
Payer: MEDICARE

## 2022-08-24 DIAGNOSIS — R46.89 SPELL OF ABNORMAL BEHAVIOR: ICD-10-CM

## 2022-08-24 DIAGNOSIS — G45.9 TIA (TRANSIENT ISCHEMIC ATTACK): ICD-10-CM

## 2022-08-24 DIAGNOSIS — R47.89 WORD FINDING DIFFICULTY: ICD-10-CM

## 2022-08-24 LAB
CREAT BLD-MCNC: 1.7 MG/DL (ref 0.7–1.3)
GFR SERPL CREATININE-BSD FRML MDRD: 39 ML/MIN/1.73M2
LVEF ECHO: NORMAL

## 2022-08-24 PROCEDURE — 82565 ASSAY OF CREATININE: CPT

## 2022-08-24 PROCEDURE — G1010 CDSM STANSON: HCPCS | Mod: GC | Performed by: RADIOLOGY

## 2022-08-24 PROCEDURE — 70496 CT ANGIOGRAPHY HEAD: CPT | Mod: MG | Performed by: RADIOLOGY

## 2022-08-24 PROCEDURE — 93306 TTE W/DOPPLER COMPLETE: CPT | Performed by: STUDENT IN AN ORGANIZED HEALTH CARE EDUCATION/TRAINING PROGRAM

## 2022-08-24 PROCEDURE — 70498 CT ANGIOGRAPHY NECK: CPT | Mod: MG | Performed by: RADIOLOGY

## 2022-08-24 RX ORDER — IOPAMIDOL 755 MG/ML
75 INJECTION, SOLUTION INTRAVASCULAR ONCE
Status: COMPLETED | OUTPATIENT
Start: 2022-08-24 | End: 2022-08-24

## 2022-08-24 RX ADMIN — IOPAMIDOL 500 ML: 755 INJECTION, SOLUTION INTRAVASCULAR at 11:02

## 2022-08-24 RX ADMIN — Medication 5 ML: at 10:43

## 2022-09-25 ENCOUNTER — HEALTH MAINTENANCE LETTER (OUTPATIENT)
Age: 82
End: 2022-09-25

## 2022-09-28 ENCOUNTER — TELEPHONE (OUTPATIENT)
Dept: FAMILY MEDICINE | Facility: CLINIC | Age: 82
End: 2022-09-28

## 2022-09-28 NOTE — TELEPHONE ENCOUNTER
"There is no consent to communicate with anyone but the patient (td-9/28/2022)    I did receive verbal permission from Tunde to speak with Dianne for his needs today.     Dianne reports that Tunde had a TIA in early July. It effected his right hand and that is the hand he uses to walk with a cane. It becomes quite painful and makes it hard to use the cane. He is having a lot of pain in his hand, back and neck. He has seen by orthopedic (Dr. Whitley). They addressed the back and neck pain and recommended PT but did not address the hand pain. They received a call from [facility] that they are unable to take him on for home care PT and were advised to call their insurance to see what home care agency could offer the home PT service. Dianne reports that 2 doctors stated it was carpal tunnel, but could not tell me who they were. Kebede hand issue has been going since after he had his TIA. It has been worsening since being seen by ortho, but no other symptoms associated with this right hand pain. Sometimes the pain is tolerable. He does use his \"arthritis stuff\" on his hand and does give him some relief. Dianne believes that she may be able to get him into the clinic for a few PT sessions to get a PT plan and then do them at home as her grandson is a chiropractor and he could help with this.    Nursing advice:    1. Dianne is going to send a [employer] message to Dr. Kurtz asking to give a referral for in clinic Pt and go from there.  2. I have assisted them in making an appointment as listed below for his hand pain as it effects his ADLs.  If it worsens in any way he is to go to urgent care and if it is significant or changes with other symptoms involved they are to go to the E.R. for assessment. They asked that the 12/2/2022 appointment for this be cancelled. This was done. Dianne verbalized good understanding, agrees with plan and needs no further support.  Thank you. Leesa Nagy R.N.     Next 5 appointments (look out 90 " days)    Sep 30, 2022 11:40 AM  (Arrive by 11:20 AM)  Provider Visit with Sylvia Myers MD  [facility] ([facility] ) [address] [place] [id number] [phone]

## 2022-09-30 ENCOUNTER — OFFICE VISIT (OUTPATIENT)
Dept: FAMILY MEDICINE | Facility: CLINIC | Age: 82
End: 2022-09-30
Payer: MEDICARE

## 2022-09-30 VITALS
WEIGHT: 233.4 LBS | BODY MASS INDEX: 35.37 KG/M2 | SYSTOLIC BLOOD PRESSURE: 105 MMHG | TEMPERATURE: 97.4 F | HEIGHT: 68 IN | DIASTOLIC BLOOD PRESSURE: 67 MMHG | HEART RATE: 75 BPM | OXYGEN SATURATION: 95 %

## 2022-09-30 DIAGNOSIS — Z23 ENCOUNTER FOR IMMUNIZATION: ICD-10-CM

## 2022-09-30 DIAGNOSIS — Z13.220 SCREENING FOR HYPERLIPIDEMIA: ICD-10-CM

## 2022-09-30 DIAGNOSIS — M79.641 PAIN OF RIGHT HAND: Primary | ICD-10-CM

## 2022-09-30 DIAGNOSIS — N18.30 CKD STAGE 3 DUE TO TYPE 2 DIABETES MELLITUS (H): ICD-10-CM

## 2022-09-30 DIAGNOSIS — E11.22 CKD STAGE 3 DUE TO TYPE 2 DIABETES MELLITUS (H): ICD-10-CM

## 2022-09-30 DIAGNOSIS — E66.01 MORBID OBESITY (H): ICD-10-CM

## 2022-09-30 DIAGNOSIS — E08.319 DIABETIC RETINOPATHY WITHOUT MACULAR EDEMA ASSOCIATED WITH DIABETES MELLITUS DUE TO UNDERLYING CONDITION, UNSPECIFIED LATERALITY, UNSPECIFIED RETINOPATHY SEVERITY (H): ICD-10-CM

## 2022-09-30 DIAGNOSIS — R20.0 NUMBNESS AND TINGLING IN BOTH HANDS: ICD-10-CM

## 2022-09-30 DIAGNOSIS — R20.2 NUMBNESS AND TINGLING IN BOTH HANDS: ICD-10-CM

## 2022-09-30 LAB
ANION GAP SERPL CALCULATED.3IONS-SCNC: 4 MMOL/L (ref 3–14)
BUN SERPL-MCNC: 25 MG/DL (ref 7–30)
CALCIUM SERPL-MCNC: 8.4 MG/DL (ref 8.5–10.1)
CHLORIDE BLD-SCNC: 113 MMOL/L (ref 94–109)
CO2 SERPL-SCNC: 25 MMOL/L (ref 20–32)
CREAT SERPL-MCNC: 1.52 MG/DL (ref 0.66–1.25)
CREAT UR-MCNC: 222 MG/DL
GFR SERPL CREATININE-BSD FRML MDRD: 45 ML/MIN/1.73M2
GLUCOSE BLD-MCNC: 136 MG/DL (ref 70–99)
HBA1C MFR BLD: 6.9 % (ref 0–5.6)
MICROALBUMIN UR-MCNC: 21 MG/L
MICROALBUMIN/CREAT UR: 9.46 MG/G CR (ref 0–17)
POTASSIUM BLD-SCNC: 4.6 MMOL/L (ref 3.4–5.3)
SODIUM SERPL-SCNC: 142 MMOL/L (ref 133–144)

## 2022-09-30 PROCEDURE — 80048 BASIC METABOLIC PNL TOTAL CA: CPT | Performed by: PREVENTIVE MEDICINE

## 2022-09-30 PROCEDURE — 36415 COLL VENOUS BLD VENIPUNCTURE: CPT | Performed by: PREVENTIVE MEDICINE

## 2022-09-30 PROCEDURE — 99214 OFFICE O/P EST MOD 30 MIN: CPT | Mod: 25 | Performed by: PREVENTIVE MEDICINE

## 2022-09-30 PROCEDURE — G0009 ADMIN PNEUMOCOCCAL VACCINE: HCPCS | Performed by: PREVENTIVE MEDICINE

## 2022-09-30 PROCEDURE — 90677 PCV20 VACCINE IM: CPT | Performed by: PREVENTIVE MEDICINE

## 2022-09-30 PROCEDURE — 82043 UR ALBUMIN QUANTITATIVE: CPT | Performed by: PREVENTIVE MEDICINE

## 2022-09-30 PROCEDURE — 83036 HEMOGLOBIN GLYCOSYLATED A1C: CPT | Performed by: PREVENTIVE MEDICINE

## 2022-09-30 ASSESSMENT — PAIN SCALES - GENERAL: PAINLEVEL: EXTREME PAIN (8)

## 2022-09-30 NOTE — PROGRESS NOTES
"  Assessment & Plan     Pain of right hand  -await imaging   -differentials considered include Osteoarthritis, carpal tunnel.   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 associated with diabetes mellitus due to underlying condition, unspecified laterality, unspecified retinopathy severity (H)  - BASIC METABOLIC PANEL  - Albumin Random Urine Quantitative with Creat Ratio  - HEMOGLOBIN A1C  - Home Care Referral  -await labs  -continue current medication     No results found for: A1C      Morbid obesity (H)  - Home Care Referral    Wt Readings from Last 2 Encounters:   09/30/22 105.9 kg (233 lb 6.4 oz)   08/10/22 105.2 kg (232 lb)       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    Weight as of this encounter: 105.9 kg (233 lb 6.4 oz).       Return in about 4 weeks (around 10/28/2022) if symptoms worsen or fail to improve.    Sylvia Myers MD MPH    [facility]    Santiago Griffiths is a 82 year old presenting for the following health issues:  Hand Pain (Right hand. Numbness and tingling.)      History of Present Illness       Reason for visit:  Hand pain and numbness/tingling  Symptom onset:  More than a " month  Symptoms include:  Joint stiffness, hand/finger pain, numbness and tingling  Symptom intensity:  Severe  Symptom progression:  Staying the same  Had these symptoms before:  No  What makes it worse:  Moving them, bumping them  What makes it better:  Bio freeze cream and aspercreme    He eats 2-3 servings of fruits and vegetables daily.He consumes 0 sweetened beverage(s) daily.He exercises with enough effort to increase his heart rate 9 or less minutes per day.  He exercises with enough effort to increase his heart rate 3 or less days per week.   He is taking medications regularly.     Right hand pain, has been seen by several doctors ad has been told he has Carpal tunnel.  After TIA had some weird sensation like there was tape on his digits.     Was seen in the hospital for TIA July 22    Bilateral, more on right side, hurts and keeps him awake at night. Has used brace and Aspercreme.  Middle and ring finger, at night may come beyond the wrist, fingers seem more swollen. No rash. Feels strength is diminised, feels reduced , drops fork etc.     Family had questions about if Gabapentin would be an option, discussed that will proceed with Hand therapy first and if not helpful then consider medication like Gabapentin, due to Chronic Kidney Disease, risk of falls and dizziness in older patients.     Diabetes:  -eye exam done 5/9/22 with Shira  -glucose readings at home are good, about 130 mg/dl         Hyperlipidemia Follow-Up       Are you regularly taking any medication or supplement to lower your cholesterol?   Yes- statin     Are you having muscle aches or other side effects that you think could be caused by your cholesterol lowering medication?  No     Hypertension Follow-up       Do you check your blood pressure regularly outside of the clinic? No     Are you following a low salt diet? Yes    Are your blood pressures ever more than 140 on the top number (systolic) OR more       than 90 on the bottom  "number (diastolic), for example 140/90? No      Review of Systems   Constitutional, HEENT, cardiovascular, pulmonary, gi and gu systems are negative, except as otherwise noted.      Objective    /67 (BP Location: Left arm, Patient Position: Sitting, Cuff Size: Adult Large)   Pulse 75   Temp 97.4  F (36.3  C) (Oral)   Ht 1.727 m (5' 7.99\")   Wt 105.9 kg (233 lb 6.4 oz)   SpO2 95%   BMI 35.50 kg/m    Body mass index is 35.5 kg/m .  Physical Exam   GENERAL APPEARANCE: seated in wheelchair   EYES: Eyes grossly normal to inspection and conjunctivae and sclerae normal  RESP: lungs clear to auscultation - no rales, rhonchi or wheezes  CV: regular rates and rhythm, normal S1 S2  PSYCH: mentation appears normal  Right Hand: swelling of PIP and DIP joints, some decrease in strength, Finkelstein's negative.   Tinel and Phalen negative, stiffness+     No results found for this or any previous visit (from the past 24 hour(s)).            "

## 2022-09-30 NOTE — RESULT ENCOUNTER NOTE
Tunde,     Three month glucose number is at goal at 6.9. Other labs are pending.     Please do not hesitate to call us at [phone] if you have any questions or concerns.    Thank you,    Sylvia Myers MD MPH

## 2022-09-30 NOTE — NURSING NOTE
Prior to immunization administration, verified patients identity using patient s name and date of birth. Please see Immunization Activity for additional information.     Screening Questionnaire for Adult Immunization    Are you sick today?   No   Do you have allergies to medications, food, a vaccine component or latex?   No   Have you ever had a serious reaction after receiving a vaccination?   No   Do you have a long-term health problem with heart, lung, kidney, or metabolic disease (e.g., diabetes), asthma, a blood disorder, no spleen, complement component deficiency, a cochlear implant, or a spinal fluid leak?  Are you on long-term aspirin therapy?   No   Do you have cancer, leukemia, HIV/AIDS, or any other immune system problem?   No   Do you have a parent, brother, or sister with an immune system problem?   No   In the past 3 months, have you taken medications that affect  your immune system, such as prednisone, other steroids, or anticancer drugs; drugs for the treatment of rheumatoid arthritis, Crohn s disease, or psoriasis; or have you had radiation treatments?   No   Have you had a seizure, or a brain or other nervous system problem?   No   During the past year, have you received a transfusion of blood or blood    products, or been given immune (gamma) globulin or antiviral drug?   No   For women: Are you pregnant or is there a chance you could become       pregnant during the next month?   No   Have you received any vaccinations in the past 4 weeks?   No     Immunization questionnaire answers were all negative.        Per orders of Dr. Myers, injection of PCV20 given by Dorinda Myers RN. Patient instructed to remain in clinic for 15 minutes afterwards, and to report any adverse reaction to me immediately.       Screening performed by Dorinda Myers RN on 9/30/2022 at 12:11 PM.

## 2022-09-30 NOTE — PATIENT INSTRUCTIONS
Referral Details    I did put in a HOME care referral, not sure if will meet criteria for home care. If not then you would need to schedule for out patient physical therapy.     Referred By  Referred To   Sylvia Myers MD   [address] [facility] [address]   Phone: [phone]   Fax: [phone]    Diagnoses: Pain of right hand   Numbness and tingling in both hands   Order: Orthopedic  Referral       Comment: Please be aware that coverage of these services is subject to the terms and limitations of your health insurance plan.  Call member services at your health plan with any benefit or coverage questions.   The [facility]  will call you to coordinate your care as prescribed by your provider. A representative will call you within 1 business day to help schedule your appointment, or you may contact the  Representative at: [phone]           Sylvia Myers MD   [address] [facility] [address]   Phone: [phone]   Fax: [phone]    Diagnoses: Pain of right hand   Numbness and tingling in both hands   Order: Occupational Therapy Referral       Comment: Please be aware that coverage of these services is subject to the terms and limitations of your health insurance plan.  Call member services at your health plan with any benefit or coverage questions.   If you have not heard from the scheduling office within 2 business days, please call [phone] for [facility],              At [facility], we strive to deliver an exceptional experience to you, every time we see you. If you receive a survey, please complete it as we do value your feedback.  If you have [employer], you can expect to receive results automatically within 24 hours of their completion.  Your provider will send a note interpreting your results as well.   If you do not have MyChart, you should receive your results in about a week by mail.    Your  care team:                            Family Medicine Internal Medicine   MD John Sanchez MD Shantel Branch-Fleming, MD Srinivasa Vaka, MD Katya Belousova, OUMAR Emerson (Hill), ROSALIA Schultz MD Pediatrics   Slim Whitman, MD Latha Boyd MD Amelia Massimini APRN ZITA Vega, APRN ZITA Haque MD             Clinic hours: Monday - Thursday 7 am-6 pm; Fridays 7 am-5 pm.   Urgent care: Monday - Friday 10 am- 8 pm; Saturday and Sunday 9 am-5 pm.    Clinic: [phone]       [facility]: Monday - Thursday 8 am - 7 pm; Friday 8 am - 6 pm  [facility]: [phone]

## 2022-10-04 NOTE — RESULT ENCOUNTER NOTE
Tunde,     Urine sample is not showing any abnormal protein.  Electrolytes and non fasting glucose are within normal limits.  Kidney function is Low but stable for you. Please avoid over the counter medications such as Ibuprofen, Advil and Aleve.     Please do not hesitate to call us at [phone] if you have any questions or concerns.    Thank you,    Sylvia Myers MD MPH

## 2022-10-27 NOTE — PROGRESS NOTES
Hand Therapy Initial Evaluation    Current Date:  10/28/2022  Referring Provider: Sylvia Myers MD MD Order Date: 9/30/2022    Diagnosis: Pain of right hand; Numbness and tingling in both hands  DOI: July-August 2022    Subjective:  Tunde Gutierrez is a 82 year old male.    Patient reports symptoms of the bilateral hands which occurred due to TIA. Since onset symptoms are Unchanged  General health as reported by patient is good.  Pertinent medical history includes:Diabetes, High Blood Pressure, Numbness/Tingling, Osteoarthritis  Medical allergies:none.    Surgical history:  Past Surgical History:   Procedure Laterality Date     JOINT REPLACEMENT       ORTHOPEDIC SURGERY     Medication history:   Current Outpatient Medications   Medication     acetaminophen (TYLENOL) 500 MG tablet     albuterol (PROAIR HFA/PROVENTIL HFA/VENTOLIN HFA) 108 (90 Base) MCG/ACT inhaler     alcohol swab prep pads     alfuzosin ER (UROXATRAL) 10 MG 24 hr tablet     amoxicillin (AMOXIL) 500 MG capsule     aspirin 81 MG EC tablet     blood glucose (NO BRAND SPECIFIED) test strip     blood glucose monitoring (NO BRAND SPECIFIED) meter device kit     ezetimibe (ZETIA) 10 MG tablet     JANUMET  MG tablet     losartan (COZAAR) 50 MG tablet     meloxicam (MOBIC) 7.5 MG tablet     niacin 500 MG tablet     rosuvastatin (CRESTOR) 20 MG tablet     spironolactone (ALDACTONE) 25 MG tablet     thin (NO BRAND SPECIFIED) lancets     VITAMIN D (ERGOCALCIFEROL) PO     No current facility-administered medications for this visit.     Current occupation is retired    Occupational Profile Information:  Right hand dominant  Prior functional level:  independent-shared household chores  Patient reports symptoms of pain, stiffness/loss of motion, weakness/loss of strength, edema, numbness and tingling   Special tests:  x-ray.    Previous treatment: OTC brace, topical creams, pain medication  Mobility: Uses wheelchair  Leisure activities/hobbies:  gambling    Functional Outcome Measure:   Upper Extremity Functional Index Score:  SCORE:   Column Totals: /80: 35   (A lower score indicates greater disability.)    Objective:  Pain Report:  - none    + mild    ++ moderate    +++ severe    10/28/2022   At Rest +   With Use +++     Pain Description  Date 10/28/2022   Location R IF and LF PIPs and DIPs, numbness and tingling in all finger tips   Pain Quality Sharp and Shooting   Frequency constant     Pain is worst  daytime or nighttime   Exacerbated by  use   Relieved by Icy hot, accupunction   Progression Gradually worsening     ROM  Thumb 10/28/2022 10/28/2022   AROM  (PROM) L R   MP /51 /53   IP /25 /29   RABD 37 42   PABD 31 45   Retropulsion (cm)     Kapandji Opposition Scale (0-10/10) 9 3     Wrist 10/28/2022 10/28/2022   AROM (PROM) L R   Extension 46 54   Flexion 26 26     Thumb Observation/Appearance   - none  + mild    ++ moderate    +++ severe     10/28/2022   Shoulder deformity present over CMC R: +   Edema over the CMC joint R: -   Noted collapse of MP into hyperextension during pinch R: +      Provocative Tests  Pain Report:  - none    + mild    ++ moderate    +++ severe     10/28/2022   Crepitus present R: -   CMC Adduction Stress Test R: -   CMC Extension Stress Test R: +       Special Tests  Pain Report:  - none    + mild    ++ moderate    +++ severe    10/28/2022   Median Nerve Compression at Pronator R:-  L:+ @ 15 secs   Carpal Compression Test--Durkan Test (30 sec) R:-  L: -   Ochoa Test for Lumbrical Incursion  (fist x 30 secs) R:  L:   Tinels at Carpal Tunnel R:-  L:   Phalens R: -  L:-     Neural Tension Testing  MNT: Median Neurodynamic Test (based on ZULY Valentine's ULNT)   10/28/2022   0-5 Scale R: 2+/5  L: 2/5   Position:   0/5: Arm across abdomen in coronal plane  1/5: Depress shoulder, ER to neutral ABD shoulder to 45 degrees  2/5: ER shoulder to end range, keep elbow at 90 degrees  3/5: Extend elbow to 0 degrees  4/5: Fully supinate  forearm  5/5: Extend wrist, fingers and thumb  Notes:    (+) indicates beyond grade level but less than long term to next level    (-) indicates over long term to level    S1  onset/change of patient's symptoms    S2 definite stop point based on patient's discomfort level    Strength   (Measured in pounds)  Pain Report: - none  + mild    ++ moderate    +++ severe    10/28/2022 10/28/2022   Trials L R   1  2  3 65 1+++   Average 65 1     Palpation   Pain Report:  - none    + mild    ++ moderate    +++ severe    10/28/2022   CMC Joint Line R: +   Thenar Reading R: -   Web Space R: -   1st DC R: +   Radial Styloid R: -   FCR R: +     Assessment:  Patient presents with symptoms consistent with diagnosis of pain of right hand and numbness and tingling in both hands, with conservative intervention.    Patient's limitations or Problem List includes:  Pain, Decreased ROM/motion, Increased edema, Weakness, Sensory disturbance, Decreased stability, Decreased  and Tightness in musculature of the bilateral hand which interferes with the patient's ability to perform Self Care Tasks (dressing, hygiene/toileting), Sleep Patterns, Recreational Activities and Household Chores as compared to previous level of function.    Rehab Potential:  Good - Return to full activity, some limitations    Patient will benefit from skilled Occupational Therapy to increase ROM, flexibility, motion, overall strength,  strength, pinch strength, stability of thumb and sensation and decrease pain and edema to return to previous activity level and resume normal daily tasks and to reach their rehab potential.    Barriers to Learning:  No barrier    Communication Issues:  Patient appears to be able to clearly communicate and understand verbal and written communication and follow directions correctly.  Family member present for session to facilitate follow through of home program.    Chart Review: Chart Review and Simple history review with  patient    Identified Performance Deficits: bathing/showering, dressing, hygiene and grooming, health management and maintenance, home establishment and management, meal preparation and cleanup, shopping and sleep    Assessment of Occupational Performance:  5 or more Performance Deficits    Clinical Decision Making (Complexity): Low complexity    Treatment Explanation:  The following has been discussed with the patient:    RX ordered/plan of care  Anticipated outcomes  Possible risks and side effects    Plan:  Frequency:  1 X week, once daily  Duration:  for 8 weeks    Treatment Plan:    Modalities:    US and Paraffin   Therapeutic Exercise:    AROM, AAROM, PROM, Tendon Gliding, Blocking, Place and Hold, Isotonics, Isometrics and Stabilization  Therapeutic Activities:   Functional activities   Neuromuscular re-ed:   Nerve Gliding and Kinesiotaping  Manual Techniques:   Joint mobilization, Friction massage, Myofascial release and Manual edema mobilization  Orthotic Fabrication:    Static  Self Care:    Self Care Tasks and Ergonomic Considerations    Discharge Plan:  Achieve all LTG  [place] in home treatment program.  Reach maximal therapeutic benefit.    Home Program:  Orthosis Wear and Care  Warmth  Ball Massage to Flexors  Finger Active Range of Motion Tendon Glides Fist Series  Finger Active Range of Motion FDS Flexor Tendon Gliding  Finger Active Range of Motion DIP Joint Blocking  Median Nerve Mobility    Next Visit:  Review HEP  Review orthosis fit  Fabricate left wrist in-neutral orthosis  Consider hand-based thumb spica  MFR to flexors, thenars, webspace  Joint mobilization  Median nerve gliding

## 2022-10-28 ENCOUNTER — THERAPY VISIT (OUTPATIENT)
Dept: OCCUPATIONAL THERAPY | Facility: CLINIC | Age: 82
End: 2022-10-28
Attending: PREVENTIVE MEDICINE
Payer: MEDICARE

## 2022-10-28 DIAGNOSIS — R20.2 NUMBNESS AND TINGLING IN BOTH HANDS: ICD-10-CM

## 2022-10-28 DIAGNOSIS — R20.0 NUMBNESS AND TINGLING IN BOTH HANDS: ICD-10-CM

## 2022-10-28 DIAGNOSIS — M79.641 PAIN OF RIGHT HAND: ICD-10-CM

## 2022-10-28 PROCEDURE — 97165 OT EVAL LOW COMPLEX 30 MIN: CPT | Mod: GO

## 2022-10-28 PROCEDURE — 97110 THERAPEUTIC EXERCISES: CPT | Mod: GO

## 2022-10-28 PROCEDURE — 97760 ORTHOTIC MGMT&TRAING 1ST ENC: CPT | Mod: GO

## 2022-10-28 NOTE — PROGRESS NOTES
[facility]    OUTPATIENT Occupational Therapy ORTHOPEDIC EVALUATION  PLAN OF TREATMENT FOR OUTPATIENT REHABILITATION  (COMPLETE FOR INITIAL CLAIMS ONLY)  Patient's Last Name, First Name, M.I.  YOB: 1940  Tunde Gutierrez    Provider s Name:  [facility]   Medical Record No.  [id number]   Start of Care Date:  10/28/22   Onset Date:   09/30/22 (MD Order Date)   Treatment Diagnosis:  Pain of right hand Medical Diagnosis:     Pain of right hand  Numbness and tingling in both hands       Goals:     10/28/22 0500   Goal #1   Goal #1 household chores   Previous Performance Level Independent   Current Functional Task Lift   Current Performance Level Maximum Difficulty   STG Target Perfomance Shopping bag   STG Target Perform Level Moderate Difficulty   Due Date 11/25/22   LTG Target Task/Performance No Difficulty   Due Date 12/23/22       Therapy Frequency:  1x weekly  Predicted Duration of Therapy Intervention:  8 weeks    Shasha Curtis OT                 I CERTIFY THE NEED FOR THESE SERVICES FURNISHED UNDER        THIS PLAN OF TREATMENT AND WHILE UNDER MY CARE     (Physician attestation of this document indicates review and certification of the therapy plan).                     Certification Date From:  10/28/22   Certification Date To:  12/23/22    Referring Provider:  Sylvia Myers    Initial Assessment        See Epic Evaluation SOC Date: 10/28/22

## 2022-11-04 ENCOUNTER — THERAPY VISIT (OUTPATIENT)
Dept: OCCUPATIONAL THERAPY | Facility: CLINIC | Age: 82
End: 2022-11-04
Payer: MEDICARE

## 2022-11-04 DIAGNOSIS — R20.2 NUMBNESS AND TINGLING IN BOTH HANDS: ICD-10-CM

## 2022-11-04 DIAGNOSIS — M79.641 PAIN OF RIGHT HAND: Primary | ICD-10-CM

## 2022-11-04 DIAGNOSIS — R20.0 NUMBNESS AND TINGLING IN BOTH HANDS: ICD-10-CM

## 2022-11-04 PROCEDURE — 97110 THERAPEUTIC EXERCISES: CPT | Mod: GO

## 2022-11-04 PROCEDURE — 97112 NEUROMUSCULAR REEDUCATION: CPT | Mod: GO

## 2022-11-13 ENCOUNTER — TELEPHONE (OUTPATIENT)
Dept: FAMILY MEDICINE | Facility: CLINIC | Age: 82
End: 2022-11-13

## 2022-11-13 NOTE — TELEPHONE ENCOUNTER
Reason for Call:  Other appointment    Detailed comments:f/up er appt. And b-12 shot     Phone Number Patient can be reached at: Cell number on file:    Telephone Information:   Mobile [phone]       Best Time: anytime     Can we leave a detailed message on this number? YES    Call taken on 11/13/2022 at 2:00 PM by Twyla Caro

## 2022-11-14 ENCOUNTER — MYC MEDICAL ADVICE (OUTPATIENT)
Dept: FAMILY MEDICINE | Facility: CLINIC | Age: 82
End: 2022-11-14

## 2022-11-14 ENCOUNTER — TELEPHONE (OUTPATIENT)
Dept: INTERNAL MEDICINE | Facility: CLINIC | Age: 82
End: 2022-11-14

## 2022-11-14 NOTE — TELEPHONE ENCOUNTER
Reason for Call:  Other appointment    Detailed comments: Patient was in the hospital during the weekend and he got out Saturday and yesterday the hospital called and they said that he needs to see someone as soon as possible because his B12 it was low. Daughter is wondering if a doctor can put an order for a B12 shot as well.     Phone Number Patient can be reached at: [phone]- Christal daughter    Best Time: Any time    Can we leave a detailed message on this number? YES    Call taken on 11/14/2022 at 7:37 AM by Vivi Carrasco

## 2022-11-15 NOTE — TELEPHONE ENCOUNTER
Called and spoke to wife.      Assisted in scheduling an appointment for tomorrow to follow up with the provider.      Jess Castrejon RN  [facility]

## 2022-11-15 NOTE — TELEPHONE ENCOUNTER
Refer to mychart encounter from 11/14.    Appointment scheduled.      Jess Castrejon RN  [facility]

## 2022-11-15 NOTE — TELEPHONE ENCOUNTER
Refer to mychart encounter from 11/14.      Appointment scheduled.      Jess Castrejon RN  [facility]

## 2022-11-16 ENCOUNTER — OFFICE VISIT (OUTPATIENT)
Dept: FAMILY MEDICINE | Facility: CLINIC | Age: 82
End: 2022-11-16
Payer: MEDICARE

## 2022-11-16 VITALS
HEART RATE: 61 BPM | TEMPERATURE: 97.8 F | WEIGHT: 228 LBS | DIASTOLIC BLOOD PRESSURE: 61 MMHG | RESPIRATION RATE: 18 BRPM | HEIGHT: 68 IN | SYSTOLIC BLOOD PRESSURE: 113 MMHG | BODY MASS INDEX: 34.56 KG/M2 | OXYGEN SATURATION: 96 %

## 2022-11-16 DIAGNOSIS — E53.8 VITAMIN B12 DEFICIENCY (NON ANEMIC): ICD-10-CM

## 2022-11-16 DIAGNOSIS — N18.30 CKD STAGE 3 DUE TO TYPE 2 DIABETES MELLITUS (H): ICD-10-CM

## 2022-11-16 DIAGNOSIS — E66.01 MORBID OBESITY (H): ICD-10-CM

## 2022-11-16 DIAGNOSIS — E11.22 CKD STAGE 3 DUE TO TYPE 2 DIABETES MELLITUS (H): ICD-10-CM

## 2022-11-16 DIAGNOSIS — I10 BENIGN ESSENTIAL HYPERTENSION: ICD-10-CM

## 2022-11-16 DIAGNOSIS — E55.9 VITAMIN D DEFICIENCY: ICD-10-CM

## 2022-11-16 DIAGNOSIS — F41.9 ANXIETY: ICD-10-CM

## 2022-11-16 DIAGNOSIS — E08.319 DIABETIC RETINOPATHY WITHOUT MACULAR EDEMA ASSOCIATED WITH DIABETES MELLITUS DUE TO UNDERLYING CONDITION, UNSPECIFIED LATERALITY, UNSPECIFIED RETINOPATHY SEVERITY (H): ICD-10-CM

## 2022-11-16 DIAGNOSIS — G45.9 TIA (TRANSIENT ISCHEMIC ATTACK): Primary | ICD-10-CM

## 2022-11-16 PROCEDURE — 99215 OFFICE O/P EST HI 40 MIN: CPT | Performed by: PREVENTIVE MEDICINE

## 2022-11-16 RX ORDER — NEEDLES, SAFETY 18GX1 1/2"
NEEDLE, DISPOSABLE MISCELLANEOUS
Qty: 50 EACH | Refills: 0 | Status: SHIPPED | OUTPATIENT
Start: 2022-11-16 | End: 2023-02-26

## 2022-11-16 RX ORDER — SERTRALINE HYDROCHLORIDE 25 MG/1
25 TABLET, FILM COATED ORAL DAILY
Qty: 30 TABLET | Refills: 1 | Status: SHIPPED | OUTPATIENT
Start: 2022-11-16 | End: 2023-01-16

## 2022-11-16 RX ORDER — LOSARTAN POTASSIUM 50 MG/1
100 TABLET ORAL DAILY
Start: 2022-11-16 | End: 2023-07-17

## 2022-11-16 RX ORDER — CYANOCOBALAMIN 1000 UG/ML
1 INJECTION, SOLUTION INTRAMUSCULAR; SUBCUTANEOUS
Qty: 10 ML | Refills: 1 | Status: SHIPPED | OUTPATIENT
Start: 2022-11-16 | End: 2023-08-07

## 2022-11-16 ASSESSMENT — PAIN SCALES - GENERAL: PAINLEVEL: NO PAIN (0)

## 2022-11-16 NOTE — PROGRESS NOTES
Assessment & Plan     TIA (transient ischemic attack)  -hospital summary reviewed  -echocardiogram, MRI brain, and MRA head and neck. This was again without evidence of stroke or embolic source. Neurology was consulted and had EEG done which showed no seizure activity. Urinalysis showed no evidence of urinary tract infection and clinically there was no evidence of infection to suggest a cause of possible delirium.   -He was back to normal at the time of discharge. He will resume all prior medications. He will have a 2 week zio patch heart monitor mailed to his home. He will follow up with Neurology.   - Home Care Referral    Diabetic retinopathy without macular edema associated with diabetes mellitus due to underlying condition, unspecified laterality, unspecified retinopathy severity (H)  -HbA1C in the hospital on 11/10/22 was 6.9  - Home Care Referral  - Lipid panel reflex to direct LDL Non-fasting    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)  -avoid over the counter NSAIDs  -GFR 11/10/22 was 49  - Home Care Referral  - Basic metabolic panel  (Ca, Cl, CO2, Creat, Gluc, K, Na, BUN)    Morbid obesity (H)  Wt Readings from Last 2 Encounters:   11/16/22 103.4 kg (228 lb)   09/30/22 105.9 kg (233 lb 6.4 oz)       Vitamin D deficiency  -levels in the hospital at 10.9  -start high dose Vitamin D replacement, recheck levels in 3 months   - vitamin D3 (CHOLECALCIFEROL) 1.25 MG (55063 UT) capsule  Dispense: 8 capsule; Refill: 0    Vitamin B12 deficiency (non anemic)  -levels are at 62  -family can do injections at home till Home care gets started  -Weekly injection for 4 weeks, then recheck labs (can do at home if home care has started), once levels are normal then do once a month injections for maintenance   - Home Care Referral (needs injections for Vitamin B 12 replacement)   - cyanocobalamin (CYANOCOBALAMIN) 1000 MCG/ML injection  Dispense: 10 mL; Refill: 1  - CBC with platelets  - Vitamin B12  -  "syringe/needle, disp, (BD ECLIPSE SYRINGE) 25G X 1\" 3 ML MISC  Dispense: 50 each; Refill: 0    Anxiety  -has had increased anxiety per patient and family  -Video visit follow up in 4 weeks  -drug interaction between Meloxicam and Sertraline noted. Will monitor for now. No past history of GI bleeds.   - sertraline (ZOLOFT) 25 MG tablet  Dispense: 30 tablet; Refill: 1  -titrate dose up slowly if needed    We discussed the treatment for anxiety in detail.  The importance of a multi faceted approach in controlling symptoms was reviewed.  The benefits of cognitive behavioral therapy reviewed, benefits of exercise, and stress reduction also discussed.      Duration of treatment is at least 9 months with medication. It may take 3-4 weeks before symptom improvement happens.  Do not stop medication suddenly, medication will need to be tapered off.  Slight increased risk of suicide with SSRI group of medications discussed.      Benign essential hypertension  -dose in the hospital was changed from 50 mg to 100 mg daily   - losartan (COZAAR) 50 MG tablet      Review of external notes as documented elsewhere in note  Ordering of each unique test  Prescription drug management  65 minutes spent on the date of the encounter doing chart review, history and exam, documentation and further activities per the note     MED REC REQUIRED  Post Medication Reconciliation Status:  Discharge medications reconciled and changed, see notes/orders    BMI:   Estimated body mass index is 34.68 kg/m  as calculated from the following:    Height as of this encounter: 1.727 m (5' 7.99\").    Weight as of this encounter: 103.4 kg (228 lb).       Return in about 4 weeks (around 12/14/2022) for labs.    Sylvia Myers MD MPH    [facility]    Santiago Griffiths is a 82 year old, presenting for the following health issues:  Lab Only and Hospital F/U      HPI       Here with family, wife and grandson (who is a physician)   Has had " "some anxiety around appointments  No falls since being home  No chest pain  No palpitations  No severe headaches  No slurred speech  Had some headaches  No past anxiety medication  Sleep is OK except for going to the bathroom  Does not drive  Does need home care  Vitamin D levels low at 10      B12 deficiency   Family was called with results after discharge from the hospital  Levels are at 62  There is no mention of this in the discharge summary from the hospital, said follow up with primary to sort this out   With needing B12 replacement, family can help with self administering the injections (several family members are nurses) at least till Home care is able to start     Once weekly 1000 mcg till deficit is replaced and then once a month     Hospital Follow-up Visit:    Hospital/Nursing Home/IP Rehab Facility: [facility]  Date of Admission: 11/10/22  Date of Discharge: 11/12/22  Reason(s) for Admission: Slurred speech    Was your hospitalization related to COVID-19? No   Problems taking medications regularly:  None  Medication changes since discharge: None  Problems adhering to non-medication therapy:  None    Summary of hospitalization:  CareEverywhere information obtained and reviewed  Diagnostic Tests/Treatments reviewed.  Follow up needed: Follow up on B 12 and Vitamin D deficiencies.   Other Healthcare Providers Involved in Patient s Care:         cardiology, Neurology   Update since discharge: improved.         Plan of care communicated with patient and family             Saw Cardiology yesterday, and had Zio patch process started, were told this would be mailed to them   Neurology appointment pending       The following is a summary from the hospital summary:    \"Admission Date: 11/10/2022  Discharge Date: 11/12/2022    Discharge Plan: Tunde ROCK Lisandratorito was discharged to home.    Principal Diagnosis     Transient ischemic attack     Hospital Problem List   Principal Problem:  TIA (transient ischemic " "attack)  Active Problems:  Hypertension  Other and unspecified hyperlipidemia  BPH (benign prostatic hyperplasia)  Type 2 diabetes mellitus with other specified complication (HC)    ADDITIONAL COMMENTS REGARDING DIAGNOSIS SPECIFICITY  Additional Diagnosis Information         BMI>30, which is consistent with OBESITY. This is clinically significant due to increased nursing cares, use of resources and specialty equipment.       Hospital Course     Tunde Gutierrez is a 82 y.o. male with a past medical history significant for hypertension, type 2 diabetes mellitus, hyperlipidemia. Starting at around 1 PM on the day of admission, his family noticed the patient having difficulty with expressing himself. He would only say 1 word at a time and it did not make sense. He had had a similar episode in July, 2022, with workup at that time, including echocardiogram and mri, negative for evidence of stroke or embolic source.     This time he had the same presentation and underwent the same work up. Specifically echocardiogram, MRI brain, and MRA head and neck. This was again without evidence of stroke or embolic source. Neurology was consulted and had EEG done which showed no seizure activity. Urinalysis showed no evidence of urinary tract infection and clinically there was no evidence of infection to suggest a cause of possible delirium.     He was back to normal at the time of discharge. He will resume all prior medications. He will have a 2 week zio patch heart monitor mailed to his home. He will follow up with Neurology.     Recommendations for Outpatient Provider   PCP: Sylvia Myers MD     Recommendations for outpatient provider   Stroke like [place]. Evaluation negative. Home with zio patch. Follow up with Neurology. \"         Review of Systems   Constitutional, HEENT, cardiovascular, pulmonary, gi and gu systems are negative, except as otherwise noted.      Objective    /61   Pulse 61   Temp 97.8  F (36.6  C) " "(Tympanic)   Resp 18   Ht 1.727 m (5' 7.99\")   Wt 103.4 kg (228 lb)   SpO2 96%   BMI 34.68 kg/m    Body mass index is 34.68 kg/m .  Physical Exam   GENERAL APPEARANCE: healthy, alert and no distress, seated in wheelchair   EYES: Eyes grossly normal to inspection and conjunctivae and sclerae normal  RESP: lungs clear to auscultation - no rales, rhonchi or wheezes  CV: regular rates and rhythm, normal S1 S2  ABDOMEN: no rebound or guarding   NEURO: Normal strength and tone, mentation intact and speech normal  PSYCH: mentation appears normal      No results found for this or any previous visit (from the past 24 hour(s)).                      "

## 2022-11-17 ENCOUNTER — MEDICAL CORRESPONDENCE (OUTPATIENT)
Dept: HEALTH INFORMATION MANAGEMENT | Facility: CLINIC | Age: 82
End: 2022-11-17

## 2022-11-18 ENCOUNTER — TELEPHONE (OUTPATIENT)
Dept: FAMILY MEDICINE | Facility: CLINIC | Age: 82
End: 2022-11-18

## 2022-11-30 ENCOUNTER — LAB REQUISITION (OUTPATIENT)
Dept: LAB | Facility: CLINIC | Age: 82
End: 2022-11-30
Payer: MEDICARE

## 2022-11-30 LAB
ANION GAP SERPL CALCULATED.3IONS-SCNC: 14 MMOL/L (ref 7–15)
BUN SERPL-MCNC: 21.9 MG/DL (ref 8–23)
CALCIUM SERPL-MCNC: 9.2 MG/DL (ref 8.8–10.2)
CHLORIDE SERPL-SCNC: 106 MMOL/L (ref 98–107)
CHOLEST SERPL-MCNC: 108 MG/DL
CREAT SERPL-MCNC: 1.26 MG/DL (ref 0.67–1.17)
DEPRECATED HCO3 PLAS-SCNC: 19 MMOL/L (ref 22–29)
ERYTHROCYTE [DISTWIDTH] IN BLOOD BY AUTOMATED COUNT: 12.6 % (ref 10–15)
GFR SERPL CREATININE-BSD FRML MDRD: 56 ML/MIN/1.73M2
GLUCOSE SERPL-MCNC: 193 MG/DL (ref 70–99)
HCT VFR BLD AUTO: 36.4 % (ref 40–53)
HDLC SERPL-MCNC: 36 MG/DL
HGB BLD-MCNC: 11.8 G/DL (ref 13.3–17.7)
LDLC SERPL CALC-MCNC: 47 MG/DL
MCH RBC QN AUTO: 32.3 PG (ref 26.5–33)
MCHC RBC AUTO-ENTMCNC: 32.4 G/DL (ref 31.5–36.5)
MCV RBC AUTO: 100 FL (ref 78–100)
NONHDLC SERPL-MCNC: 72 MG/DL
PLATELET # BLD AUTO: 178 10E3/UL (ref 150–450)
POTASSIUM SERPL-SCNC: 4.9 MMOL/L (ref 3.4–5.3)
RBC # BLD AUTO: 3.65 10E6/UL (ref 4.4–5.9)
SODIUM SERPL-SCNC: 139 MMOL/L (ref 136–145)
TRIGL SERPL-MCNC: 127 MG/DL
WBC # BLD AUTO: 7.9 10E3/UL (ref 4–11)

## 2022-11-30 PROCEDURE — 80048 BASIC METABOLIC PNL TOTAL CA: CPT | Mod: ORL | Performed by: PREVENTIVE MEDICINE

## 2022-11-30 PROCEDURE — 80061 LIPID PANEL: CPT | Mod: ORL | Performed by: PREVENTIVE MEDICINE

## 2022-11-30 PROCEDURE — 36415 COLL VENOUS BLD VENIPUNCTURE: CPT | Mod: ORL | Performed by: PREVENTIVE MEDICINE

## 2022-11-30 PROCEDURE — 85027 COMPLETE CBC AUTOMATED: CPT | Mod: ORL | Performed by: PREVENTIVE MEDICINE

## 2022-12-02 NOTE — RESULT ENCOUNTER NOTE
Tunde,     Electrolytes are normal. Kidney function is improved from last check in the hospital.   Cholesterol is at goal for you.  Basic blood count is showing anemia, but I expect this to improve as Vitamin B 12 is replaced. The plan is to check the B 12 levels 4 weeks after B 12 injections have been started.     Please do not hesitate to call us at [phone] if you have any questions or concerns.    Thank you,    Sylvia Myers MD MPH

## 2022-12-09 ENCOUNTER — MEDICAL CORRESPONDENCE (OUTPATIENT)
Dept: HEALTH INFORMATION MANAGEMENT | Facility: CLINIC | Age: 82
End: 2022-12-09

## 2022-12-09 DIAGNOSIS — Z53.9 DIAGNOSIS NOT YET DEFINED: Primary | ICD-10-CM

## 2022-12-09 PROCEDURE — G0180 MD CERTIFICATION HHA PATIENT: HCPCS | Performed by: PREVENTIVE MEDICINE

## 2022-12-14 PROCEDURE — 82607 VITAMIN B-12: CPT | Mod: ORL | Performed by: PREVENTIVE MEDICINE

## 2022-12-16 ENCOUNTER — TELEPHONE (OUTPATIENT)
Dept: FAMILY MEDICINE | Facility: CLINIC | Age: 82
End: 2022-12-16

## 2022-12-16 NOTE — TELEPHONE ENCOUNTER
Writer left detailed VM for patient regarding provider message below, of how often to check vitamin B12 levels. Patient informed we will be checked every 6 months for the first year and then annually. Office number also given in VM if patient has any further questions or concerns.    MIROSLAVA Dickey, RN  [facility]    _____________________    ----- Message from Sylvia Myers MD sent at 12/16/2022  4:42 PM     We would monitor every 6 months for the first year and then annually.    Thank you,  Sylvia Myers MD MPH     ----- Message -----  From: Betsey Parks RN  Sent: 12/15/2022   2:17 PM CST  To: Sylvia Myers MD    Writer found patient's duplicate chart (MRN [id number]), and was able to find a phone number to contact patient in that chart, [phone]. Writer spoke with patient and relayed provider message below. Patient verbalized understanding.     Patient wondering how often he needs to get his lab work done when doing monthly B12 injections.    Will route back to provider to review and advise. Please call patient back at [phone] (OK to speak with wife, CTC on file).      MIROSLAVA Dickey, RN  [facility]

## 2023-01-09 ENCOUNTER — MYC MEDICAL ADVICE (OUTPATIENT)
Dept: FAMILY MEDICINE | Facility: CLINIC | Age: 83
End: 2023-01-09

## 2023-01-09 ENCOUNTER — TELEPHONE (OUTPATIENT)
Dept: FAMILY MEDICINE | Facility: CLINIC | Age: 83
End: 2023-01-09

## 2023-01-09 NOTE — TELEPHONE ENCOUNTER
Opal RN from [facility], calling re: patient's spouse request. See note below as well. Opal states that spouse was noting that since changing his B12 dosing to monthly, she has noticed his neuropathy symptoms have worsened. Spouse wondering if his B12 level is affecting this.    Opal states that either her or Any will be seeing patient this Wednesday/Thursday, and are wondering if provider wants them to draw any of the future lab orders during next visit (currently has lipids, BMP, B12 and CBC w/ plat ordered as future). Opal states we can call her back at [phone] with provider response, states that verbal orders to draw labs would be easiest for them.     Routing to provider as high priority to be addressed before Wednesday/Thursday.      CINTHIA DickeyN, RN  [facility]

## 2023-01-09 NOTE — TELEPHONE ENCOUNTER
Any RN with [facility] home care calling.  She has been drawing labs monthly on pt for two months. Pt wife is asking if that is going to continue. Any ARRIOLA does not have any orders to draw currently. She will be seeing pt next week and that will be 4 weeks from last draw,.  Please clarify if she is to continue drawing labs and if so which ones.     Melisa VICENTEN, RN

## 2023-01-10 NOTE — TELEPHONE ENCOUNTER
Thank you for the update. Yes, I would recommend the labs be rechecked. The Future orders for labs can be used. OK to give a verbal order.     Sylvia Myers MD MPH

## 2023-01-10 NOTE — TELEPHONE ENCOUNTER
Called Opal and relayed provider message below, verbal order given for [facility] RNs to draw Vit B12, BMP, CBC w/ platelet, and lipid panel. Opal verbalized understanding, stating no need to fax anything over - verbal order sufficient to complete order, no further questions or concerns at this time.      Betsey Parks, CINTHIAN, RN  [facility]

## 2023-01-10 NOTE — TELEPHONE ENCOUNTER
Looks like the Zio patch was done with [facility], outside of [place]. I will not be getting those results. I would recommend patient reach out to [facility] for an update.    Thank you.  Sylvia Myers MD MPH

## 2023-01-11 ENCOUNTER — TELEPHONE (OUTPATIENT)
Dept: FAMILY MEDICINE | Facility: CLINIC | Age: 83
End: 2023-01-11

## 2023-01-11 ENCOUNTER — LAB REQUISITION (OUTPATIENT)
Dept: LAB | Facility: CLINIC | Age: 83
End: 2023-01-11
Payer: MEDICARE

## 2023-01-11 DIAGNOSIS — E08.319 DIABETIC RETINOPATHY WITHOUT MACULAR EDEMA ASSOCIATED WITH DIABETES MELLITUS DUE TO UNDERLYING CONDITION, UNSPECIFIED LATERALITY, UNSPECIFIED RETINOPATHY SEVERITY (H): ICD-10-CM

## 2023-01-11 DIAGNOSIS — E53.8 VITAMIN B12 DEFICIENCY (NON ANEMIC): ICD-10-CM

## 2023-01-11 DIAGNOSIS — E11.22 CKD STAGE 3 DUE TO TYPE 2 DIABETES MELLITUS (H): Primary | ICD-10-CM

## 2023-01-11 DIAGNOSIS — N18.30 CKD STAGE 3 DUE TO TYPE 2 DIABETES MELLITUS (H): Primary | ICD-10-CM

## 2023-01-11 DIAGNOSIS — D51.8 OTHER VITAMIN B12 DEFICIENCY ANEMIAS: ICD-10-CM

## 2023-01-11 LAB
ANION GAP SERPL CALCULATED.3IONS-SCNC: 14 MMOL/L (ref 7–15)
BUN SERPL-MCNC: 23.8 MG/DL (ref 8–23)
CALCIUM SERPL-MCNC: 8.3 MG/DL (ref 8.8–10.2)
CHLORIDE SERPL-SCNC: 107 MMOL/L (ref 98–107)
CHOLEST SERPL-MCNC: 99 MG/DL
CREAT SERPL-MCNC: 1.48 MG/DL (ref 0.67–1.17)
DEPRECATED HCO3 PLAS-SCNC: 18 MMOL/L (ref 22–29)
ERYTHROCYTE [DISTWIDTH] IN BLOOD BY AUTOMATED COUNT: 12.3 % (ref 10–15)
GFR SERPL CREATININE-BSD FRML MDRD: 46 ML/MIN/1.73M2
GLUCOSE SERPL-MCNC: 254 MG/DL (ref 70–99)
HCT VFR BLD AUTO: 38.7 % (ref 40–53)
HDLC SERPL-MCNC: 31 MG/DL
HGB BLD-MCNC: 12.3 G/DL (ref 13.3–17.7)
LDLC SERPL CALC-MCNC: 33 MG/DL
MCH RBC QN AUTO: 31.9 PG (ref 26.5–33)
MCHC RBC AUTO-ENTMCNC: 31.8 G/DL (ref 31.5–36.5)
MCV RBC AUTO: 100 FL (ref 78–100)
NONHDLC SERPL-MCNC: 68 MG/DL
PLATELET # BLD AUTO: 179 10E3/UL (ref 150–450)
POTASSIUM SERPL-SCNC: 4.8 MMOL/L (ref 3.4–5.3)
RBC # BLD AUTO: 3.86 10E6/UL (ref 4.4–5.9)
SODIUM SERPL-SCNC: 139 MMOL/L (ref 136–145)
TRIGL SERPL-MCNC: 175 MG/DL
VIT B12 SERPL-MCNC: 360 PG/ML (ref 232–1245)
WBC # BLD AUTO: 7.5 10E3/UL (ref 4–11)

## 2023-01-11 PROCEDURE — 80048 BASIC METABOLIC PNL TOTAL CA: CPT | Mod: ORL | Performed by: PREVENTIVE MEDICINE

## 2023-01-11 PROCEDURE — 80061 LIPID PANEL: CPT | Mod: ORL | Performed by: PREVENTIVE MEDICINE

## 2023-01-11 PROCEDURE — 82607 VITAMIN B-12: CPT | Mod: ORL | Performed by: PREVENTIVE MEDICINE

## 2023-01-11 PROCEDURE — 85027 COMPLETE CBC AUTOMATED: CPT | Mod: ORL | Performed by: PREVENTIVE MEDICINE

## 2023-01-11 NOTE — TELEPHONE ENCOUNTER
Opal, RN with [facility] calling for orders for Skilled nursing once a week for one week, and then every other week for 8 weeks and 3 PRNs visits.    Also wondering if Dr. Myers wants any additional lab work over the next two months, such as B12. Opal lashawn labs today (some still in process in chart).     Additionally wanting to report a fall without injury that occured last weekend. Opal states patient was getting up from bed and slid down the side. Patient's clothing and sheets were slippery.     Ilda Cardona RN    [facility]

## 2023-01-12 ENCOUNTER — MEDICAL CORRESPONDENCE (OUTPATIENT)
Dept: HEALTH INFORMATION MANAGEMENT | Facility: CLINIC | Age: 83
End: 2023-01-12

## 2023-01-14 NOTE — RESULT ENCOUNTER NOTE
Tunde,     Vitamin B 12 levels are on the low side compared to last check. Changes B 12 injections to every 2 weeks from monthly.  Kidney function is low but stable for you.  Glucose is elevated at 254 mg/dl.  LDL cholesterol is at goal for you.  Blood counts are improved from one month ago.  Please schedule a Video visit to follow up on your mood symptoms and glucose.     Please do not hesitate to call us at [phone] if you have any questions or concerns.    Thank you,    Sylvia Myers MD MPH

## 2023-01-16 ENCOUNTER — E-VISIT (OUTPATIENT)
Dept: FAMILY MEDICINE | Facility: CLINIC | Age: 83
End: 2023-01-16
Payer: MEDICARE

## 2023-01-16 ENCOUNTER — MEDICAL CORRESPONDENCE (OUTPATIENT)
Dept: HEALTH INFORMATION MANAGEMENT | Facility: CLINIC | Age: 83
End: 2023-01-16

## 2023-01-16 DIAGNOSIS — F41.9 ANXIETY: Primary | ICD-10-CM

## 2023-01-16 PROCEDURE — 99421 OL DIG E/M SVC 5-10 MIN: CPT | Performed by: PREVENTIVE MEDICINE

## 2023-01-16 ASSESSMENT — ANXIETY QUESTIONNAIRES
GAD7 TOTAL SCORE: 2
8. IF YOU CHECKED OFF ANY PROBLEMS, HOW DIFFICULT HAVE THESE MADE IT FOR YOU TO DO YOUR WORK, TAKE CARE OF THINGS AT HOME, OR GET ALONG WITH OTHER PEOPLE?: NOT DIFFICULT AT ALL
7. FEELING AFRAID AS IF SOMETHING AWFUL MIGHT HAPPEN: NOT AT ALL

## 2023-01-17 NOTE — TELEPHONE ENCOUNTER
Agree with and authorize orders as requested.   Fall information noted, clinic visit not indicated at this time.  Future orders for labs placed to be done in 3 months.    Thank you.  Sylvia Myers MD MPH

## 2023-01-17 NOTE — TELEPHONE ENCOUNTER
Writer left detailed VM on RN's secure line regarding provider message below, verbal orders for HHC, noting fall, and lab orders placed to be drawn in 3 months. Clinic number left as well if RN has any further questions or concerns.      CINTHIA DickeyN, RN  [facility]

## 2023-01-28 DIAGNOSIS — Z53.9 DIAGNOSIS NOT YET DEFINED: Primary | ICD-10-CM

## 2023-01-28 PROCEDURE — G0179 MD RECERTIFICATION HHA PT: HCPCS | Performed by: PREVENTIVE MEDICINE

## 2023-02-01 ENCOUNTER — MYC MEDICAL ADVICE (OUTPATIENT)
Dept: FAMILY MEDICINE | Facility: CLINIC | Age: 83
End: 2023-02-01
Payer: OTHER GOVERNMENT

## 2023-02-13 ENCOUNTER — LAB REQUISITION (OUTPATIENT)
Dept: LAB | Facility: CLINIC | Age: 83
End: 2023-02-13
Payer: MEDICARE

## 2023-02-13 DIAGNOSIS — E53.8 DEFICIENCY OF OTHER SPECIFIED B GROUP VITAMINS: ICD-10-CM

## 2023-02-13 DIAGNOSIS — E11.22 TYPE 2 DIABETES MELLITUS WITH DIABETIC CHRONIC KIDNEY DISEASE (H): ICD-10-CM

## 2023-02-13 DIAGNOSIS — I12.9 HYPERTENSIVE CHRONIC KIDNEY DISEASE WITH STAGE 1 THROUGH STAGE 4 CHRONIC KIDNEY DISEASE, OR UNSPECIFIED CHRONIC KIDNEY DISEASE: ICD-10-CM

## 2023-02-13 LAB
ANION GAP SERPL CALCULATED.3IONS-SCNC: 12 MMOL/L (ref 7–15)
BUN SERPL-MCNC: 24.3 MG/DL (ref 8–23)
CALCIUM SERPL-MCNC: 8.9 MG/DL (ref 8.8–10.2)
CHLORIDE SERPL-SCNC: 107 MMOL/L (ref 98–107)
CHOLEST SERPL-MCNC: 119 MG/DL
CREAT SERPL-MCNC: 1.45 MG/DL (ref 0.67–1.17)
DEPRECATED HCO3 PLAS-SCNC: 20 MMOL/L (ref 22–29)
ERYTHROCYTE [DISTWIDTH] IN BLOOD BY AUTOMATED COUNT: 12.4 % (ref 10–15)
GFR SERPL CREATININE-BSD FRML MDRD: 48 ML/MIN/1.73M2
GLUCOSE SERPL-MCNC: 221 MG/DL (ref 70–99)
HCT VFR BLD AUTO: 39.9 % (ref 40–53)
HDLC SERPL-MCNC: 32 MG/DL
HGB BLD-MCNC: 12.9 G/DL (ref 13.3–17.7)
LDLC SERPL CALC-MCNC: 37 MG/DL
MCH RBC QN AUTO: 32.1 PG (ref 26.5–33)
MCHC RBC AUTO-ENTMCNC: 32.3 G/DL (ref 31.5–36.5)
MCV RBC AUTO: 99 FL (ref 78–100)
NONHDLC SERPL-MCNC: 87 MG/DL
PLATELET # BLD AUTO: 170 10E3/UL (ref 150–450)
POTASSIUM SERPL-SCNC: 5.4 MMOL/L (ref 3.4–5.3)
RBC # BLD AUTO: 4.02 10E6/UL (ref 4.4–5.9)
SODIUM SERPL-SCNC: 139 MMOL/L (ref 136–145)
TRIGL SERPL-MCNC: 251 MG/DL
VIT B12 SERPL-MCNC: 1283 PG/ML (ref 232–1245)
WBC # BLD AUTO: 8.1 10E3/UL (ref 4–11)

## 2023-02-13 PROCEDURE — 80048 BASIC METABOLIC PNL TOTAL CA: CPT | Mod: ORL | Performed by: PREVENTIVE MEDICINE

## 2023-02-13 PROCEDURE — 80061 LIPID PANEL: CPT | Mod: ORL | Performed by: PREVENTIVE MEDICINE

## 2023-02-13 PROCEDURE — 85027 COMPLETE CBC AUTOMATED: CPT | Mod: ORL | Performed by: PREVENTIVE MEDICINE

## 2023-02-13 PROCEDURE — 82607 VITAMIN B-12: CPT | Mod: ORL | Performed by: PREVENTIVE MEDICINE

## 2023-02-14 NOTE — RESULT ENCOUNTER NOTE
Tunde,     Vitamin B 12 levels are not low.  LDL cholesterol is at goal for you.  Basic blood count is showing stable anemia.   Kidney function is low but stable for you.  Glucose was elevated at 221 mg/dl  Potassium levels are slightly high at 5.4. Avoid any over the counter Potassium supplements.  We will recheck labs in 3 months.     Please do not hesitate to call us at [phone] if you have any questions or concerns.    Thank you,    Sylvia Myers MD MPH

## 2023-02-26 ENCOUNTER — MYC REFILL (OUTPATIENT)
Dept: FAMILY MEDICINE | Facility: CLINIC | Age: 83
End: 2023-02-26
Payer: MEDICARE

## 2023-02-26 DIAGNOSIS — E53.8 VITAMIN B12 DEFICIENCY (NON ANEMIC): ICD-10-CM

## 2023-02-28 RX ORDER — NEEDLES, SAFETY 18GX1 1/2"
NEEDLE, DISPOSABLE MISCELLANEOUS
Qty: 50 EACH | Refills: 0 | Status: SHIPPED | OUTPATIENT
Start: 2023-02-28 | End: 2024-04-15

## 2023-03-01 ENCOUNTER — OFFICE VISIT (OUTPATIENT)
Dept: PODIATRY | Facility: CLINIC | Age: 83
End: 2023-03-01
Payer: MEDICARE

## 2023-03-01 ENCOUNTER — TELEPHONE (OUTPATIENT)
Dept: FAMILY MEDICINE | Facility: CLINIC | Age: 83
End: 2023-03-01

## 2023-03-01 DIAGNOSIS — E11.69 ONYCHOMYCOSIS OF MULTIPLE TOENAILS WITH TYPE 2 DIABETES MELLITUS (H): Primary | ICD-10-CM

## 2023-03-01 DIAGNOSIS — B35.3 TINEA PEDIS OF BOTH FEET: ICD-10-CM

## 2023-03-01 DIAGNOSIS — M21.969 TYPE 2 DIABETES MELLITUS WITH DIABETIC FOOT DEFORMITY (H): ICD-10-CM

## 2023-03-01 DIAGNOSIS — E11.69 TYPE 2 DIABETES MELLITUS WITH DIABETIC FOOT DEFORMITY (H): ICD-10-CM

## 2023-03-01 DIAGNOSIS — B35.1 ONYCHOMYCOSIS OF MULTIPLE TOENAILS WITH TYPE 2 DIABETES MELLITUS (H): Primary | ICD-10-CM

## 2023-03-01 DIAGNOSIS — N18.30 STAGE 3 CHRONIC KIDNEY DISEASE, UNSPECIFIED WHETHER STAGE 3A OR 3B CKD (H): ICD-10-CM

## 2023-03-01 PROCEDURE — 99204 OFFICE O/P NEW MOD 45 MIN: CPT | Mod: 25 | Performed by: PODIATRIST

## 2023-03-01 PROCEDURE — 11720 DEBRIDE NAIL 1-5: CPT | Mod: Q8 | Performed by: PODIATRIST

## 2023-03-01 RX ORDER — KETOCONAZOLE 20 MG/G
CREAM TOPICAL DAILY
Qty: 60 G | Refills: 5 | Status: SHIPPED | OUTPATIENT
Start: 2023-03-01 | End: 2024-02-21

## 2023-03-01 NOTE — TELEPHONE ENCOUNTER
Spoke to Opal and relayed message.    State that she will let pt and family know.      Jess Castrejon RN  [facility]

## 2023-03-01 NOTE — LETTER
3/1/2023         RE: Tunde Gutierrez  [address]  [place] [address]        Dear Colleague,    Thank you for referring your patient, Tunde Gutierrez, to the [facility]. Please see a copy of my visit note below.    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 edema associated with diabetes mellitus due to underlying condition (H)     Hypertension     Morbid obesity (H)     Hyperlipidemia     Pain of right hand     Numbness and tingling in both hands     TIA (transient ischemic attack)     Past Surgical History:   Procedure Laterality Date     JOINT REPLACEMENT       ORTHOPEDIC SURGERY       Social History     Socioeconomic History     Marital status:      Spouse name: Not on file     Number of children: Not on file     Years of education: Not on file     Highest education level: Not on file   Occupational History     Not on file   Tobacco Use     Smoking status: Never     Smokeless tobacco: Never   Vaping Use     Vaping Use: Never used   Substance and Sexual Activity     Alcohol use: Not Currently     Drug use: Not on file     Sexual activity: Not Currently   Other Topics Concern     Not on file   Social History Narrative     Not on file     Social Determinants of Health     Financial Resource Strain: Not on file   Food Insecurity: Not on file   Transportation Needs: Not on file   Physical Activity: Not on file   Stress: Not on file   Social Connections: Not on file   Intimate Partner Violence: Not on file   Housing Stability: Not on file     No family history on file.    Lab Results   Component Value Date    A1C 6.9 09/30/2022     Lab Results   Component Value Date    WBC 8.1 02/13/2023     Lab Results   Component Value Date    RBC 4.02 02/13/2023      Lab Results   Component Value Date    HGB 12.9 02/13/2023     Lab Results   Component Value Date    HCT 39.9 02/13/2023     No components found for: MCT  Lab Results   Component Value Date    MCV 99 02/13/2023     Lab Results   Component Value Date    MCH 32.1 02/13/2023     Lab Results   Component Value Date    MCHC 32.3 02/13/2023     Lab Results   Component Value Date    RDW 12.4 02/13/2023     Lab Results   Component Value Date     02/13/2023     Last Comprehensive Metabolic Panel:  Lab Results   Component Value Date     02/13/2023    POTASSIUM 5.4 (H) 02/13/2023    CHLORIDE 107 02/13/2023    CO2 20 (L) 02/13/2023    ANIONGAP 12 02/13/2023     (H) 02/13/2023    BUN 24.3 (H) 02/13/2023    CR 1.45 (H) 02/13/2023    GFRESTIMATED 48 (L) 02/13/2023    NANCY 8.9 02/13/2023         SUBJECTIVE FINDINGS:  An 82-year-old presents with wife and granddaughter.  He relates he does not drive, so he needs transportation for toenail care and diabetic foot cares.  Relates he is diabetic.  Relates he has had some neuropathy symptoms in the right foot with some tingling last week.  Otherwise, he does not.  He does have some neuropathy in his right hand.  Relates no injuries, no ulcers or sores since we have seen him last.  He does wear 2 pairs of socks and that resolved his tingling in his right foot.  He relates he does not wear diabetic shoes.  He relates that he does not use lotion on his feet.  He relates no ulcers or sores in the past.    He relates he cannot cut his toenails.  His wife relates she cannot do it.  Upon further discussion, they also relate wondering if we can trim his fingernails as well because she keeps cutting the skin on him.    OBJECTIVE FINDINGS:  DP and PT are 1/4 bilaterally.  He has decreased hair growth bilaterally. CFT is less than 3 seconds bilaterally.  He has dry, scaly skin in a moccasin pattern bilaterally.  He has incurvated, thickened, brittle nails with subungual debris,  dystrophy and discoloration on the feet, 1 through 5 bilaterally to differing degrees.  He has incurvated nails 1 through 5 on his hands bilaterally.  There is no erythema, no drainage, no odor, no calor bilaterally.  He has some dried subungual blood under the right 2nd toenail.  Sharp/dull is intact with 5.07 [place]-Bal[place]ir monofilament bilaterally.  Deep tendon reflexes are intact bilaterally.  Proprioception is intact bilaterally.  He has dorsal medial first MPJ prominence with functional hallux limitus bilaterally.  He has dorsolateral 5th MPJ prominence bilaterally.  Mild distally contracted digits bilaterally.    ASSESSMENT AND PLAN:  Diabetes with onychomycosis, tinea pedis bilaterally.  He has foot deformities present, onychauxis of fingernails.  Diagnosis and treatment options discussed with the patient.  He has chronic kidney disease.  I did review Dr. Myers's 11/16/2022 note.  He also has diabetic retinopathy as well.  All the toenails and fingernails were debrided or reduced bilaterally upon consent.  Prescription for ketoconazole cream given and use discussed with them.  I advised them on vinegar water soaks.  They opted for no diabetic shoes.  Diabetic foot cares discussed with them.  Return to clinic and see me in 2 months.            Moderate level of medical decision making.        Again, thank you for allowing me to participate in the care of your patient.        Sincerely,        Mynor Staley DPM

## 2023-03-01 NOTE — TELEPHONE ENCOUNTER
Opal, RN from [facility], calling to update provider on patient status and see if provider would like lab work done:    - Last [facility] RN visit will be on 3/15/23, would provider like RN to do any lab work at this visit? If not, when would provider like patient to be seen in clinic next and when would they like lab work to be completed next?     Writer notes that there are future orders for B12, A1C, BMP and CBC w/ platelets in chart, with last labs drawn on 2/13/23.      Opal requesting callback at [phone], secure line, OK to leave detailed VM with provider's response      CINTHIA DickeyN, RN  [facility]

## 2023-03-01 NOTE — PROGRESS NOTES
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 edema associated with diabetes mellitus due to underlying condition (H)     Hypertension     Morbid obesity (H)     Hyperlipidemia     Pain of right hand     Numbness and tingling in both hands     TIA (transient ischemic attack)     Past Surgical History:   Procedure Laterality Date     JOINT REPLACEMENT       ORTHOPEDIC SURGERY       Social History     Socioeconomic History     Marital status:      Spouse name: Not on file     Number of children: Not on file     Years of education: Not on file     Highest education level: Not on file   Occupational History     Not on file   Tobacco Use     Smoking status: Never     Smokeless tobacco: Never   Vaping Use     Vaping Use: Never used   Substance and Sexual Activity     Alcohol use: Not Currently     Drug use: Not on file     Sexual activity: Not Currently   Other Topics Concern     Not on file   Social History Narrative     Not on file     Social Determinants of Health     Financial Resource Strain: Not on file   Food Insecurity: Not on file   Transportation Needs: Not on file   Physical Activity: Not on file   Stress: Not on file   Social Connections: Not on file   Intimate Partner Violence: Not on file   Housing Stability: Not on file     No family history on file.    Lab Results   Component Value Date    A1C 6.9 09/30/2022     Lab Results   Component Value Date    WBC 8.1 02/13/2023     Lab Results   Component Value Date    RBC 4.02 02/13/2023     Lab Results   Component Value Date    HGB 12.9 02/13/2023     Lab Results   Component Value Date    HCT 39.9 02/13/2023     No components found for: MCT  Lab Results   Component Value Date    MCV 99 02/13/2023     Lab Results   Component Value Date    MCH 32.1  02/13/2023     Lab Results   Component Value Date    MCHC 32.3 02/13/2023     Lab Results   Component Value Date    RDW 12.4 02/13/2023     Lab Results   Component Value Date     02/13/2023     Last Comprehensive Metabolic Panel:  Lab Results   Component Value Date     02/13/2023    POTASSIUM 5.4 (H) 02/13/2023    CHLORIDE 107 02/13/2023    CO2 20 (L) 02/13/2023    ANIONGAP 12 02/13/2023     (H) 02/13/2023    BUN 24.3 (H) 02/13/2023    CR 1.45 (H) 02/13/2023    GFRESTIMATED 48 (L) 02/13/2023    NANCY 8.9 02/13/2023         SUBJECTIVE FINDINGS:  An 82-year-old presents with wife and granddaughter.  He relates he does not drive, so he needs transportation for toenail care and diabetic foot cares.  Relates he is diabetic.  Relates he has had some neuropathy symptoms in the right foot with some tingling last week.  Otherwise, he does not.  He does have some neuropathy in his right hand.  Relates no injuries, no ulcers or sores since we have seen him last.  He does wear 2 pairs of socks and that resolved his tingling in his right foot.  He relates he does not wear diabetic shoes.  He relates that he does not use lotion on his feet.  He relates no ulcers or sores in the past.    He relates he cannot cut his toenails.  His wife relates she cannot do it.  Upon further discussion, they also relate wondering if we can trim his fingernails as well because she keeps cutting the skin on him.    OBJECTIVE FINDINGS:  DP and PT are 1/4 bilaterally.  He has decreased hair growth bilaterally. CFT is less than 3 seconds bilaterally.  He has dry, scaly skin in a moccasin pattern bilaterally.  He has incurvated, thickened, brittle nails with subungual debris, dystrophy and discoloration on the feet, 1 through 5 bilaterally to differing degrees.  He has incurvated nails 1 through 5 on his hands bilaterally.  There is no erythema, no drainage, no odor, no calor bilaterally.  He has some dried subungual blood under the  right 2nd toenail.  Sharp/dull is intact with 5.07 [place]-Bal[place]ir monofilament bilaterally.  Deep tendon reflexes are intact bilaterally.  Proprioception is intact bilaterally.  He has dorsal medial first MPJ prominence with functional hallux limitus bilaterally.  He has dorsolateral 5th MPJ prominence bilaterally.  Mild distally contracted digits bilaterally.    ASSESSMENT AND PLAN:  Diabetes with onychomycosis, tinea pedis bilaterally.  He has foot deformities present, onychauxis of fingernails.  Diagnosis and treatment options discussed with the patient.  He has chronic kidney disease.  I did review Dr. Myers's 11/16/2022 note.  He also has diabetic retinopathy as well.  All the toenails and fingernails were debrided or reduced bilaterally upon consent.  Prescription for ketoconazole cream given and use discussed with them.  I advised them on vinegar water soaks.  They opted for no diabetic shoes.  Diabetic foot cares discussed with them.  Return to clinic and see me in 2 months.            Moderate level of medical decision making.

## 2023-03-01 NOTE — NURSING NOTE
Tunde Gutierrez's chief complaint for this visit includes:  Chief Complaint   Patient presents with     Consult     Diabetic foot cares     PCP: Dylan Mccartney    Referring Provider:  No referring provider defined for this encounter.    There were no vitals taken for this visit.  Data Unavailable        Allergies   Allergen Reactions     Ace Inhibitors Cough         Do you need any medication refills at today's visit?

## 2023-03-01 NOTE — TELEPHONE ENCOUNTER
Thank you for the update. I would recommend an in person follow up in the next 4-6 weeks. We will check labs then if indicated.    Thank you,  Sylvia Myers MD MPH

## 2023-03-03 NOTE — TELEPHONE ENCOUNTER
Opal ARRIOLA called back requesting clarification on pt's B12 injections.    State that there is confusion as to how often pt should be getting the injections.  Relayed per provider's orders is to be given every 7 days. Opal understood and will notify family.     Opal also states that provider wanted to see pt in 4-6 weeks but soonest pt could get in is 8 weeks.  Wants to know if it is okay to wait the 8 weeks ot if provider wants to see him earlier.       Jess Castrejon RN  [facility]

## 2023-03-06 NOTE — TELEPHONE ENCOUNTER
This writer attempted to contact FLAKO Joseph on 03/06/23      Reason for call relay message from provider below and left detailed message, will close the encounter.      If Opal calls back:   Please relay message below.       Shasha Smith RN

## 2023-03-07 ENCOUNTER — MEDICAL CORRESPONDENCE (OUTPATIENT)
Dept: HEALTH INFORMATION MANAGEMENT | Facility: CLINIC | Age: 83
End: 2023-03-07

## 2023-03-10 ENCOUNTER — MEDICAL CORRESPONDENCE (OUTPATIENT)
Dept: HEALTH INFORMATION MANAGEMENT | Facility: CLINIC | Age: 83
End: 2023-03-10

## 2023-04-01 ENCOUNTER — HEALTH MAINTENANCE LETTER (OUTPATIENT)
Age: 83
End: 2023-04-01

## 2023-04-25 ENCOUNTER — OFFICE VISIT (OUTPATIENT)
Dept: FAMILY MEDICINE | Facility: CLINIC | Age: 83
End: 2023-04-25
Payer: MEDICARE

## 2023-04-25 VITALS
SYSTOLIC BLOOD PRESSURE: 101 MMHG | BODY MASS INDEX: 37.67 KG/M2 | HEART RATE: 75 BPM | HEIGHT: 67 IN | WEIGHT: 240 LBS | DIASTOLIC BLOOD PRESSURE: 64 MMHG | TEMPERATURE: 97.5 F | OXYGEN SATURATION: 94 %

## 2023-04-25 DIAGNOSIS — E08.319 DIABETIC RETINOPATHY WITHOUT MACULAR EDEMA ASSOCIATED WITH DIABETES MELLITUS DUE TO UNDERLYING CONDITION, UNSPECIFIED LATERALITY, UNSPECIFIED RETINOPATHY SEVERITY (H): Primary | ICD-10-CM

## 2023-04-25 DIAGNOSIS — E11.22 CKD STAGE 3 DUE TO TYPE 2 DIABETES MELLITUS (H): ICD-10-CM

## 2023-04-25 DIAGNOSIS — E66.01 MORBID OBESITY (H): ICD-10-CM

## 2023-04-25 DIAGNOSIS — R06.2 WHEEZING: ICD-10-CM

## 2023-04-25 DIAGNOSIS — I10 BENIGN ESSENTIAL HYPERTENSION: ICD-10-CM

## 2023-04-25 DIAGNOSIS — M15.9 GENERALIZED OSTEOARTHRITIS: ICD-10-CM

## 2023-04-25 DIAGNOSIS — G45.9 TIA (TRANSIENT ISCHEMIC ATTACK): ICD-10-CM

## 2023-04-25 DIAGNOSIS — E78.2 MIXED HYPERLIPIDEMIA: ICD-10-CM

## 2023-04-25 DIAGNOSIS — F41.9 ANXIETY: ICD-10-CM

## 2023-04-25 DIAGNOSIS — E53.8 VITAMIN B12 DEFICIENCY (NON ANEMIC): ICD-10-CM

## 2023-04-25 DIAGNOSIS — N18.30 CKD STAGE 3 DUE TO TYPE 2 DIABETES MELLITUS (H): ICD-10-CM

## 2023-04-25 DIAGNOSIS — E55.9 VITAMIN D DEFICIENCY: ICD-10-CM

## 2023-04-25 LAB
ERYTHROCYTE [DISTWIDTH] IN BLOOD BY AUTOMATED COUNT: 13 % (ref 10–15)
HBA1C MFR BLD: 7.2 % (ref 0–5.6)
HCT VFR BLD AUTO: 37.8 % (ref 40–53)
HGB BLD-MCNC: 12.5 G/DL (ref 13.3–17.7)
MCH RBC QN AUTO: 32.5 PG (ref 26.5–33)
MCHC RBC AUTO-ENTMCNC: 33.1 G/DL (ref 31.5–36.5)
MCV RBC AUTO: 98 FL (ref 78–100)
PLATELET # BLD AUTO: 156 10E3/UL (ref 150–450)
RBC # BLD AUTO: 3.85 10E6/UL (ref 4.4–5.9)
VIT B12 SERPL-MCNC: 1268 PG/ML (ref 232–1245)
WBC # BLD AUTO: 8.2 10E3/UL (ref 4–11)

## 2023-04-25 PROCEDURE — 36415 COLL VENOUS BLD VENIPUNCTURE: CPT | Performed by: PREVENTIVE MEDICINE

## 2023-04-25 PROCEDURE — 82607 VITAMIN B-12: CPT | Performed by: PREVENTIVE MEDICINE

## 2023-04-25 PROCEDURE — 83036 HEMOGLOBIN GLYCOSYLATED A1C: CPT | Performed by: PREVENTIVE MEDICINE

## 2023-04-25 PROCEDURE — 80048 BASIC METABOLIC PNL TOTAL CA: CPT | Performed by: PREVENTIVE MEDICINE

## 2023-04-25 PROCEDURE — 85027 COMPLETE CBC AUTOMATED: CPT | Performed by: PREVENTIVE MEDICINE

## 2023-04-25 PROCEDURE — 99214 OFFICE O/P EST MOD 30 MIN: CPT | Performed by: PREVENTIVE MEDICINE

## 2023-04-25 PROCEDURE — 82306 VITAMIN D 25 HYDROXY: CPT | Performed by: PREVENTIVE MEDICINE

## 2023-04-25 RX ORDER — ALBUTEROL SULFATE 90 UG/1
2 AEROSOL, METERED RESPIRATORY (INHALATION) EVERY 6 HOURS PRN
Qty: 18 G | Refills: 0 | Status: SHIPPED | OUTPATIENT
Start: 2023-04-25

## 2023-04-25 NOTE — PROGRESS NOTES
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    Periodic blood sugar testing.  Regular foot checks  Limit carbohydrates and sweets in diet  Update eye exam annually   Regular exercise, 150 minutes per week  Hypoglycemia precautions       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)  -discussed avoiding NSAIDS over the counter   - CBC with platelets  - Basic metabolic panel  (Ca, Cl, CO2, Creat, Gluc, K, Na, BUN)    Morbid obesity (H)  Wt Readings from Last 2 Encounters:   04/25/23 108.9 kg (240 lb)   11/16/22 103.4 kg (228 lb)       Vitamin B12 deficiency (non anemic)  -will start once a month injections for B 12, family member will give injection at home   - Vitamin B12    TIA (transient ischemic attack)  -no recurrent symptoms  -on Aspirin     Anxiety  -improved with selective serotonin reuptake inhibitor Sertraline  -continue current medication     Generalized osteoarthritis  -taking Meloxicam     Mixed hyperlipidemia  -on Crestor and Zetia    LDL Cholesterol Calculated   Date Value Ref Range Status   02/13/2023 37 <=100 mg/dL Final       Vitamin D deficiency  -on Vitamin D supplements   - Vitamin D Deficiency    Benign essential hypertension  -at goal  -continue current medication     Wheezing  - albuterol (PROAIR HFA/PROVENTIL HFA/VENTOLIN HFA) 108 (90 Base) MCG/ACT inhaler  Dispense: 18 g; Refill: 0      Review of external notes as documented elsewhere in note  Ordering of each unique test  Prescription drug management  31 minutes spent by me on the date of the encounter doing chart review, history and exam, documentation and  "further activities per the note       BMI:   Estimated body mass index is 37.59 kg/m  as calculated from the following:    Height as of this encounter: 1.702 m (5' 7\").    Weight as of this encounter: 108.9 kg (240 lb).       Sylvia Myers MD MPH    [facility]    Santiago Griffiths is a 83 year old, presenting for the following health issues:  Follow Up (Fell down in middle of march )         View : No data to display.                Here with wife and grandson    HPI   History of Present Illness:  What is the reason for your visit today?: check up  How many servings of fruits and vegetables do you eat daily?: 2-3  On average, how many sweetened beverages do you drink each day (Examples: soda, juice, sweet tea, etc. Do NOT count diet or artificially sweetened beverages)?: 0  How many minutes a day do you exercise enough to make your heart beat faster?: 9 or less  How many days a week do you exercise enough to make your heart beat faster?: 3 or less  How many days per week do you miss taking your medication?: 0  Your back pain is: chronic  Where is your back pain located?: right lower back, left lower back, right side of neck, left side of neck  How would you describe your back pain?: dull ache  Where does your back pain spread?: nowhere  Since you noticed your back pain, how has it changed?: unchanged  Does your back pain interfere with your job?: Not applicable  Frequency of checking blood sugars:: A few times a month  What time of day are you checking your blood sugars: Before meals  Have you had any blood sugars above 200?: No  Have you had any blood sugars below 70?: No  Hypoglycemia symptoms:: None  Diabetic concerns:: None  Paraesthesia present:: None of these symptoms  Have you had a diabetic eye exam within the last year?: Yes  Date of last eye exam:: [id number]  Where was this eye exam done?: unsure  Are you regularly taking any medication or supplement to lower your " cholesterol?: Yes  Are you having muscle aches or other side effects that you think could be caused by your cholesterol lowering medication?: No  Do you check your blood pressure regularly outside of the clinic?: No  Are your blood pressures ever more than 140 on the top number (systolic) OR more than 90 on the bottom number (diastolic)? (For example, greater than 140/90): No  Are you following a low salt diet?: No      Seen in the ED 3/10/23 for a fall and laceration, notes reviewed.   No new headaches  Using a walker  No vision changes  No LOC     History of TIA:  -no new symptoms  -chest pains   -no speech changes  -no memory changes     Hypertension:  -blood pressure at home can be checked     Anxiety:  -symptoms stable  -mood OK   -has been a lot less anxious  -has done some vacuuming     B12 deficiency:  -improved symptoms  -has completed parenteral B12      Vit D deficiency:   -taking supplement    Lipids:   -taking Zetia and Crestor  -no side effects     Coughs a lot at night and spits it out, does wheeze some if he lays on his back    Diabetes:  -Non fasting was 240 mg/dl today  -will be seeing Podiatry in a few days hence will defer diabetic foot exam today       Chronic Kidney Disease Follow-up      Do you take any over the counter pain medicine?: No      How are you feeling today? Better  In the past 24 hours have you had shortness of breath when speaking, walking, or climbing stairs? I don't have breathing problems  Do you have a cough? I don't have a cough  When is the last time you had a fever greater than 100? NA  Are you having any other symptoms? Headaches   Do you have any other stressors you would like to discuss with your provider? OTHER: wife has a list              Review of Systems   Constitutional, HEENT, cardiovascular, pulmonary, gi and gu systems are negative, except as otherwise noted.      Objective    /64 (BP Location: Right arm, Patient Position: Sitting, Cuff Size: Adult Large)  "  Pulse 75   Temp 97.5  F (36.4  C) (Oral)   Ht 1.702 m (5' 7\")   Wt 108.9 kg (240 lb)   SpO2 94%   BMI 37.59 kg/m    Body mass index is 37.59 kg/m .  Physical Exam   GENERAL APPEARANCE: healthy, alert and no distress  EYES: Eyes grossly normal to inspection and conjunctivae and sclerae normal  RESP: lungs clear to auscultation - no rales, rhonchi or wheezes  CV: regular rates and rhythm, normal S1 S2  ABDOMEN: soft, non-tender and no rebound or guarding   MS: extremities normal- no gross deformities noted  SKIN: no suspicious lesions or rashes  NEURO: Normal strength and tone, mentation intact and speech normal  PSYCH: mentation appears normal      No results found for this or any previous visit (from the past 24 hour(s)).            "

## 2023-04-25 NOTE — PATIENT INSTRUCTIONS
At [facility], we strive to deliver an exceptional experience to you, every time we see you. If you receive a survey, please complete it as we do value your feedback.  If you have MyChart, you can expect to receive results automatically within 24 hours of their completion.  Your provider will send a note interpreting your results as well.   If you do not have MyChart, you should receive your results in about a week by mail.    Your care team:                            Family Medicine Internal Medicine   MD Jonh Sanchez MD Shantel Branch-Fleming, MD Srinivasa Vaka, MD Katya Belousova, PAROSALIA Sommer CNP, MD ([facility]   Slim Whitman, MD Latha Boyd MD Amelia Massimini APRN ZITA Vega APRN MD Julio Zhang MD          Clinic hours: Monday - Thursday 7 am-6 pm; Fridays 7 am-5 pm.   Urgent care: Monday - Friday 10 am- 8 pm; Saturday and Sunday 9 am-5 pm.    Clinic: [phone]       [facility]: Monday - Thursday 8 am - 7 pm; Friday 8 am - 6 pm  [facility]: [phone]

## 2023-04-26 LAB
ANION GAP SERPL CALCULATED.3IONS-SCNC: 6 MMOL/L (ref 3–14)
BUN SERPL-MCNC: 21 MG/DL (ref 7–30)
CALCIUM SERPL-MCNC: 8.8 MG/DL (ref 8.5–10.1)
CHLORIDE BLD-SCNC: 114 MMOL/L (ref 94–109)
CO2 SERPL-SCNC: 23 MMOL/L (ref 20–32)
CREAT SERPL-MCNC: 1.58 MG/DL (ref 0.66–1.25)
DEPRECATED CALCIDIOL+CALCIFEROL SERPL-MC: 52 UG/L (ref 20–75)
GFR SERPL CREATININE-BSD FRML MDRD: 43 ML/MIN/1.73M2
GLUCOSE BLD-MCNC: 121 MG/DL (ref 70–99)
POTASSIUM BLD-SCNC: 4.9 MMOL/L (ref 3.4–5.3)
SODIUM SERPL-SCNC: 143 MMOL/L (ref 133–144)

## 2023-04-27 NOTE — RESULT ENCOUNTER NOTE
Tunde,    Vitamin D levels are normal.  Vitamin B 12 levels are not low.  Blood counts are stable for you.  Three month glucose number HbA1C is at goal for you.  Electrolytes and non fasting glucose are within normal limits.  Kidney function is low but stable for you. I would recommend recheck in 6 months, if kidney function worsens then we would need to change Metformin.     Please do not hesitate to call us at [phone] if you have any questions or concerns.    Thank you,    Sylvia Myers MD MPH

## 2023-05-03 ENCOUNTER — OFFICE VISIT (OUTPATIENT)
Dept: PODIATRY | Facility: CLINIC | Age: 83
End: 2023-05-03
Payer: MEDICARE

## 2023-05-03 DIAGNOSIS — N18.30 STAGE 3 CHRONIC KIDNEY DISEASE, UNSPECIFIED WHETHER STAGE 3A OR 3B CKD (H): ICD-10-CM

## 2023-05-03 DIAGNOSIS — B35.1 ONYCHOMYCOSIS OF MULTIPLE TOENAILS WITH TYPE 2 DIABETES MELLITUS (H): ICD-10-CM

## 2023-05-03 DIAGNOSIS — M21.969 TYPE 2 DIABETES MELLITUS WITH DIABETIC FOOT DEFORMITY (H): Primary | ICD-10-CM

## 2023-05-03 DIAGNOSIS — E11.69 TYPE 2 DIABETES MELLITUS WITH DIABETIC FOOT DEFORMITY (H): Primary | ICD-10-CM

## 2023-05-03 DIAGNOSIS — E11.69 ONYCHOMYCOSIS OF MULTIPLE TOENAILS WITH TYPE 2 DIABETES MELLITUS (H): ICD-10-CM

## 2023-05-03 PROCEDURE — 11720 DEBRIDE NAIL 1-5: CPT | Performed by: PODIATRIST

## 2023-05-03 PROCEDURE — 99214 OFFICE O/P EST MOD 30 MIN: CPT | Mod: 25 | Performed by: PODIATRIST

## 2023-05-03 ASSESSMENT — PAIN SCALES - GENERAL: PAINLEVEL: NO PAIN (0)

## 2023-05-03 NOTE — PROGRESS NOTES
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 edema associated with diabetes mellitus due to underlying condition (H)     Hypertension     Morbid obesity (H)     Hyperlipidemia     Pain of right hand     Numbness and tingling in both hands     TIA (transient ischemic attack)     Past Surgical History:   Procedure Laterality Date     JOINT REPLACEMENT       ORTHOPEDIC SURGERY       Social History     Socioeconomic History     Marital status:      Spouse name: Not on file     Number of children: Not on file     Years of education: Not on file     Highest education level: Not on file   Occupational History     Not on file   Tobacco Use     Smoking status: Never     Smokeless tobacco: Never   Vaping Use     Vaping status: Never Used   Substance and Sexual Activity     Alcohol use: Not Currently     Drug use: Not on file     Sexual activity: Not Currently   Other Topics Concern     Not on file   Social History Narrative     Not on file     Social Determinants of Health     Financial Resource Strain: Not on file   Food Insecurity: Not on file   Transportation Needs: Not on file   Physical Activity: Not on file   Stress: Not on file   Social Connections: Not on file   Intimate Partner Violence: Not on file   Housing Stability: Not on file     No family history on file.    Lab Results   Component Value Date    A1C 7.2 04/25/2023    A1C 6.9 09/30/2022     Lab Results   Component Value Date    WBC 8.2 04/25/2023     Lab Results   Component Value Date    RBC 3.85 04/25/2023     Lab Results   Component Value Date    HGB 12.5 04/25/2023     Lab Results   Component Value Date    HCT 37.8 04/25/2023     No components found for: MCT  Lab Results   Component Value Date    MCV 98 04/25/2023     Lab Results   Component  Value Date    MCH 32.5 04/25/2023     Lab Results   Component Value Date    MCHC 33.1 04/25/2023     Lab Results   Component Value Date    RDW 13.0 04/25/2023     Lab Results   Component Value Date     04/25/2023     Last Comprehensive Metabolic Panel:  Lab Results   Component Value Date     04/25/2023    POTASSIUM 4.9 04/25/2023    CHLORIDE 114 (H) 04/25/2023    CO2 23 04/25/2023    ANIONGAP 6 04/25/2023     (H) 04/25/2023    BUN 21 04/25/2023    CR 1.58 (H) 04/25/2023    GFRESTIMATED 43 (L) 04/25/2023    NANCY 8.8 04/25/2023           SUBJECTIVE FINDINGS:  An 83 year old returns to clinic with his wife for diabetic foot cares.  He relates he is doing well.  He relates no ulcers or sores since we have seen him last.  He could use his toenails cut.  He relates he has been intermittently using lotion on his feet.  He does go stocking footed around the house.  He needs a new prescription for diabetic shoes and inserts.  He relates he does have some neuropathy in his feet.  No ulcers or sores.    OBJECTIVE FINDINGS:  DP and PT are 1/4 bilaterally.  He has decreased hair growth bilaterally.  He has incurvated nails with some thickening, dystrophy and subungual debris to differing degrees bilaterally.  There is no erythema, no drainage, no odor, no calor bilaterally.  He has dorsally contracted digits bilaterally.  He has decreased ankle joint dorsiflexion bilaterally.  There are no gross tendon voids bilaterally.    ASSESSMENT AND PLAN:  Diabetes with Onychomycosis and foot deformities.  Diagnosis and treatment options discussed with him.  The nails bilaterally were reduced upon consent.  He also has chronic Kidney disease and some retinopathy as well.  I advised him to continue the ketoconazole cream.  Prescription for Diabetic shoes and inserts given and use discussed with him.  Return to clinic and see me in 2-3 months.  Previous notes reviewed.  His Tinea Pedis appears resolved.            Moderate  level of medical decision making.

## 2023-05-03 NOTE — NURSING NOTE
Tunde Gutierrez's chief complaint for this visit includes:  Chief Complaint   Patient presents with     Left Foot - Follow Up     Right Foot - Follow Up     PCP: Dylan Mccartney    Referring Provider:  No referring provider defined for this encounter.    There were no vitals taken for this visit.  No Pain (0)     Do you need any medication refills at today's visit? NO    Allergies   Allergen Reactions     Ace Inhibitors Cough       Marcelino Navarro, EMT

## 2023-05-03 NOTE — LETTER
5/3/2023         RE: Tunde Gutierrez  [address]  [place] [address]        Dear Colleague,    Thank you for referring your patient, Tunde Gutierrez, to the [facility]. Please see a copy of my visit note below.    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 edema associated with diabetes mellitus due to underlying condition (H)     Hypertension     Morbid obesity (H)     Hyperlipidemia     Pain of right hand     Numbness and tingling in both hands     TIA (transient ischemic attack)     Past Surgical History:   Procedure Laterality Date     JOINT REPLACEMENT       ORTHOPEDIC SURGERY       Social History     Socioeconomic History     Marital status:      Spouse name: Not on file     Number of children: Not on file     Years of education: Not on file     Highest education level: Not on file   Occupational History     Not on file   Tobacco Use     Smoking status: Never     Smokeless tobacco: Never   Vaping Use     Vaping status: Never Used   Substance and Sexual Activity     Alcohol use: Not Currently     Drug use: Not on file     Sexual activity: Not Currently   Other Topics Concern     Not on file   Social History Narrative     Not on file     Social Determinants of Health     Financial Resource Strain: Not on file   Food Insecurity: Not on file   Transportation Needs: Not on file   Physical Activity: Not on file   Stress: Not on file   Social Connections: Not on file   Intimate Partner Violence: Not on file   Housing Stability: Not on file     No family history on file.    Lab Results   Component Value Date    A1C 7.2 04/25/2023    A1C 6.9 09/30/2022     Lab Results   Component Value Date    WBC 8.2 04/25/2023     Lab Results   Component Value Date     RBC 3.85 04/25/2023     Lab Results   Component Value Date    HGB 12.5 04/25/2023     Lab Results   Component Value Date    HCT 37.8 04/25/2023     No components found for: MCT  Lab Results   Component Value Date    MCV 98 04/25/2023     Lab Results   Component Value Date    MCH 32.5 04/25/2023     Lab Results   Component Value Date    MCHC 33.1 04/25/2023     Lab Results   Component Value Date    RDW 13.0 04/25/2023     Lab Results   Component Value Date     04/25/2023     Last Comprehensive Metabolic Panel:  Lab Results   Component Value Date     04/25/2023    POTASSIUM 4.9 04/25/2023    CHLORIDE 114 (H) 04/25/2023    CO2 23 04/25/2023    ANIONGAP 6 04/25/2023     (H) 04/25/2023    BUN 21 04/25/2023    CR 1.58 (H) 04/25/2023    GFRESTIMATED 43 (L) 04/25/2023    NANCY 8.8 04/25/2023           SUBJECTIVE FINDINGS:  An 83 year old returns to clinic with his wife for diabetic foot cares.  He relates he is doing well.  He relates no ulcers or sores since we have seen him last.  He could use his toenails cut.  He relates he has been intermittently using lotion on his feet.  He does go stocking footed around the house.  He needs a new prescription for diabetic shoes and inserts.  He relates he does have some neuropathy in his feet.  No ulcers or sores.    OBJECTIVE FINDINGS:  DP and PT are 1/4 bilaterally.  He has decreased hair growth bilaterally.  He has incurvated nails with some thickening, dystrophy and subungual debris to differing degrees bilaterally.  There is no erythema, no drainage, no odor, no calor bilaterally.  He has dorsally contracted digits bilaterally.  He has decreased ankle joint dorsiflexion bilaterally.  There are no gross tendon voids bilaterally.    ASSESSMENT AND PLAN:  Diabetes with Onychomycosis and foot deformities.  Diagnosis and treatment options discussed with him.  The nails bilaterally were reduced upon consent.  He also has chronic Kidney disease and some retinopathy  as well.  I advised him to continue the ketoconazole cream.  Prescription for Diabetic shoes and inserts given and use discussed with him.  Return to clinic and see me in 2-3 months.  Previous notes reviewed.  His Tinea Pedis appears resolved.            Moderate level of medical decision making.      Again, thank you for allowing me to participate in the care of your patient.        Sincerely,        Mynor Staley DPM

## 2023-06-01 ENCOUNTER — MEDICAL CORRESPONDENCE (OUTPATIENT)
Dept: FAMILY MEDICINE | Facility: CLINIC | Age: 83
End: 2023-06-01
Payer: MEDICARE

## 2023-06-05 DIAGNOSIS — M15.9 GENERALIZED OSTEOARTHRITIS: ICD-10-CM

## 2023-06-07 RX ORDER — MELOXICAM 7.5 MG/1
TABLET ORAL
Qty: 90 TABLET | Refills: 3 | OUTPATIENT
Start: 2023-06-07

## 2023-06-07 NOTE — TELEPHONE ENCOUNTER
Based on low kidney function, it is best to stop this medication.   Thank you,  Sylvia Myers MD MPH

## 2023-06-17 ENCOUNTER — HEALTH MAINTENANCE LETTER (OUTPATIENT)
Age: 83
End: 2023-06-17

## 2023-07-11 ENCOUNTER — OFFICE VISIT (OUTPATIENT)
Dept: FAMILY MEDICINE | Facility: CLINIC | Age: 83
End: 2023-07-11
Payer: MEDICARE

## 2023-07-11 VITALS
SYSTOLIC BLOOD PRESSURE: 112 MMHG | TEMPERATURE: 97 F | HEIGHT: 67 IN | WEIGHT: 225.6 LBS | BODY MASS INDEX: 35.41 KG/M2 | DIASTOLIC BLOOD PRESSURE: 62 MMHG | HEART RATE: 70 BPM | RESPIRATION RATE: 16 BRPM | OXYGEN SATURATION: 94 %

## 2023-07-11 DIAGNOSIS — Z23 NEED FOR VACCINATION: ICD-10-CM

## 2023-07-11 DIAGNOSIS — E11.319 TYPE 2 DIABETES MELLITUS WITH RETINOPATHY OF BOTH EYES, WITHOUT LONG-TERM CURRENT USE OF INSULIN, MACULAR EDEMA PRESENCE UNSPECIFIED, UNSPECIFIED RETINOPATHY SEVERITY (H): ICD-10-CM

## 2023-07-11 DIAGNOSIS — H61.23 BILATERAL IMPACTED CERUMEN: Primary | ICD-10-CM

## 2023-07-11 PROCEDURE — 69209 REMOVE IMPACTED EAR WAX UNI: CPT | Mod: 50 | Performed by: FAMILY MEDICINE

## 2023-07-11 PROCEDURE — 91312 COVID-19 BIVALENT 12+ (PFIZER): CPT | Performed by: FAMILY MEDICINE

## 2023-07-11 PROCEDURE — 99213 OFFICE O/P EST LOW 20 MIN: CPT | Mod: 25 | Performed by: FAMILY MEDICINE

## 2023-07-11 PROCEDURE — 0124A COVID-19 BIVALENT 12+ (PFIZER): CPT | Performed by: FAMILY MEDICINE

## 2023-07-11 ASSESSMENT — PAIN SCALES - GENERAL: PAINLEVEL: NO PAIN (0)

## 2023-07-11 NOTE — PROGRESS NOTES
"  Assessment & Plan     (H61.23) Bilateral impacted cerumen  (primary encounter diagnosis)  Comment: resolved following irrigation by my MA.  Plan: MI REMOVAL IMPACTED CERUMEN IRRIGATION/LVG         UNILAT        follow up as needed. Hearing aid evaluation planned.    (E11.319) Type 2 diabetes mellitus with retinopathy of both eyes, without long-term current use of insulin, macular edema presence unspecified, unspecified retinopathy severity (H)  Comment: well controlled   Plan: Adult Eye  Referral        Return in about 3 months (around 10/25/2023) for Medicare annual wellness exam, diabetes.      (Z23) Need for vaccination  Comment:   Plan: COVID-19 BIVALENT 12+ (PFIZER)                   Art Harris MD  [facility]    Santiago Griffiths is a 83 year old, presenting for the following health issues:  Cerumen Impaction        7/11/2023    11:06 AM   Additional Questions   Roomed by Holly   Accompanied by wife     Healthy Habits:     Taking medications regularly:  0  History of Present Illness       Reason for visit:  Earcleaning  Symptoms include:  Ear wax  Symptom progression:  Staying the same  What makes it better:  When ears are cleaned    He eats 0-1 servings of fruits and vegetables daily.He consumes 0 sweetened beverage(s) daily.He exercises with enough effort to increase his heart rate 9 or less minutes per day.  He exercises with enough effort to increase his heart rate 3 or less days per week.   He is taking medications regularly.       Patient declined Medicare Wellness      The patient is wanting to get hearing aids but was told he has cerumen impaction in his left ear and needs to get this cleaned before proper evaluation.    Review of Systems         Objective    /62 (BP Location: Left arm, Patient Position: Sitting, Cuff Size: Adult Regular)   Pulse 70   Temp 97  F (36.1  C) (Oral)   Resp 16   Ht 1.702 m (5' 7\")   Wt 102.3 kg (225 lb 9.6 oz)   " SpO2 94%   BMI 35.33 kg/m    Body mass index is 35.33 kg/m .  Physical Exam   GENERAL: healthy, alert and no distress  EYES: Eyes grossly normal to inspection, PERRL, EOMI, sclerae white and conjunctivae normal  MS: no gross musculoskeletal defects noted, no edema  SKIN: no suspicious lesions or rashes to visible skin  NEURO: Normal strength and tone, sensory exam grossly normal, mentation intact, oriented times 3 and cranial nerves 2-12 intact  PSYCH: mentation appears normal, affect normal/bright   EARS: bilateral cerumen impaction. Once resolved, the TMs and the ear canals are normal appearing                  This document serves as a record of the services and decisions personally performed and made by Dr. Harris. It was created on his behalf by Nikita Ballard, a trained medical scribe. The creation of this document is based the provider's statements to the medical scribe.  Nikita Ballard,  1:06 PM

## 2023-07-14 ENCOUNTER — TELEPHONE (OUTPATIENT)
Dept: FAMILY MEDICINE | Facility: CLINIC | Age: 83
End: 2023-07-14
Payer: MEDICARE

## 2023-07-14 NOTE — TELEPHONE ENCOUNTER
Reason for Call:  Appointment Request    Patient requesting this type of appt:  Ear wash    Requested provider:  Art Harris    Reason patient unable to be scheduled: Not within requested timeframe    When does patient want to be seen/preferred time: 3-7 days    Comments: Patient needs 2nd earwash, would like to schedule with Art Harris MD    Could we send this information to you in [facility] or would you prefer to receive a phone call?:   Patient would prefer a phone call   Okay to leave a detailed message?: Yes at Home number on file [phone] (home)    Call taken on 7/14/2023 at 3:17 PM by Sergio Nogueira

## 2023-07-21 ENCOUNTER — OFFICE VISIT (OUTPATIENT)
Dept: FAMILY MEDICINE | Facility: CLINIC | Age: 83
End: 2023-07-21
Payer: MEDICARE

## 2023-07-21 VITALS
HEART RATE: 80 BPM | OXYGEN SATURATION: 96 % | WEIGHT: 233.2 LBS | BODY MASS INDEX: 35.34 KG/M2 | HEIGHT: 68 IN | DIASTOLIC BLOOD PRESSURE: 56 MMHG | SYSTOLIC BLOOD PRESSURE: 100 MMHG | TEMPERATURE: 97.7 F | RESPIRATION RATE: 15 BRPM

## 2023-07-21 DIAGNOSIS — H61.21 CERUMEN DEBRIS ON TYMPANIC MEMBRANE OF RIGHT EAR: Primary | ICD-10-CM

## 2023-07-21 PROCEDURE — 69209 REMOVE IMPACTED EAR WAX UNI: CPT | Mod: RT | Performed by: FAMILY MEDICINE

## 2023-07-21 ASSESSMENT — PAIN SCALES - GENERAL: PAINLEVEL: NO PAIN (0)

## 2023-07-21 NOTE — PROGRESS NOTES
"  Assessment & Plan     (H61.21) Cerumen debris on tympanic membrane of right ear  (primary encounter diagnosis)  Comment: resolved following irrigation by my MA  Plan: DE REMOVAL IMPACTED CERUMEN IRRIGATION/LVG         UNILAT        Proceed with hearing aid work with the audiologist          Art Harris MD  [facility]    Santiago Griffiths is a 83 year old, presenting for the following health issues:  Cerumen (impacted)        7/21/2023     2:24 PM   Additional Questions   Roomed by Holly   Accompanied by son     History of Present Illness       Reason for visit:  Earcleaning  Symptom onset:  1-2 weeks ago  Symptoms include:  Ear wax build up  Symptom progression:  Staying the same    He eats 0-1 servings of fruits and vegetables daily.He consumes 0 sweetened beverage(s) daily.He exercises with enough effort to increase his heart rate 9 or less minutes per day.  He exercises with enough effort to increase his heart rate 3 or less days per week.   He is taking medications regularly.     Patient declined Medicare Annual Wellness        Review of Systems         Objective    /56 (BP Location: Left arm, Patient Position: Sitting, Cuff Size: Adult Large)   Pulse 80   Temp 97.7  F (36.5  C) (Oral)   Resp 15   Ht 1.73 m (5' 8.11\")   Wt 105.8 kg (233 lb 3.2 oz)   SpO2 96%   BMI 35.34 kg/m    Body mass index is 35.34 kg/m .  Physical Exam   GENERAL: healthy, alert and no distress  EYES: Eyes grossly normal to inspection, PERRL, EOMI, sclerae white and conjunctivae normal  MS: no gross musculoskeletal defects noted, no edema  SKIN: no suspicious lesions or rashes to visible skin  NEURO: Normal strength and tone, sensory exam grossly normal, mentation intact, oriented times 3 and cranial nerves 2-12 intact  PSYCH: mentation appears normal, affect normal/bright   HENT: normal cephalic/atraumatic, right ear: there is a piece of dried cerumen laying against the TM, not present on " his previous exam (following irrigation, the debris has been removed, and the TM and canal are normal), and left ear: normal: no effusions, no erythema, normal landmarks                 This document serves as a record of the services and decisions personally performed and made by Dr. Harris. It was partially created on his behalf by Nikita Ballard, a trained medical scribe. The creation of this document is based the provider's statements to the medical scribe.  Nikita Ballard,  6:39 PM

## 2023-08-02 ENCOUNTER — OFFICE VISIT (OUTPATIENT)
Dept: PODIATRY | Facility: CLINIC | Age: 83
End: 2023-08-02
Payer: MEDICARE

## 2023-08-02 ENCOUNTER — OFFICE VISIT (OUTPATIENT)
Dept: OPTOMETRY | Facility: CLINIC | Age: 83
End: 2023-08-02
Attending: FAMILY MEDICINE
Payer: MEDICARE

## 2023-08-02 DIAGNOSIS — H52.4 PRESBYOPIA: Primary | ICD-10-CM

## 2023-08-02 DIAGNOSIS — N18.30 STAGE 3 CHRONIC KIDNEY DISEASE, UNSPECIFIED WHETHER STAGE 3A OR 3B CKD (H): ICD-10-CM

## 2023-08-02 DIAGNOSIS — H04.123 DRY EYES: ICD-10-CM

## 2023-08-02 DIAGNOSIS — M21.969 TYPE 2 DIABETES MELLITUS WITH DIABETIC FOOT DEFORMITY (H): Primary | ICD-10-CM

## 2023-08-02 DIAGNOSIS — B35.3 TINEA PEDIS OF BOTH FEET: ICD-10-CM

## 2023-08-02 DIAGNOSIS — E11.69 TYPE 2 DIABETES MELLITUS WITH DIABETIC FOOT DEFORMITY (H): Primary | ICD-10-CM

## 2023-08-02 DIAGNOSIS — B35.1 ONYCHOMYCOSIS OF MULTIPLE TOENAILS WITH TYPE 2 DIABETES MELLITUS (H): ICD-10-CM

## 2023-08-02 DIAGNOSIS — E11.69 ONYCHOMYCOSIS OF MULTIPLE TOENAILS WITH TYPE 2 DIABETES MELLITUS (H): ICD-10-CM

## 2023-08-02 DIAGNOSIS — E11.319 TYPE 2 DIABETES MELLITUS WITH RETINOPATHY OF BOTH EYES, WITHOUT LONG-TERM CURRENT USE OF INSULIN, MACULAR EDEMA PRESENCE UNSPECIFIED, UNSPECIFIED RETINOPATHY SEVERITY (H): ICD-10-CM

## 2023-08-02 PROCEDURE — 11720 DEBRIDE NAIL 1-5: CPT | Mod: Q8 | Performed by: PODIATRIST

## 2023-08-02 PROCEDURE — 92004 COMPRE OPH EXAM NEW PT 1/>: CPT

## 2023-08-02 PROCEDURE — 99214 OFFICE O/P EST MOD 30 MIN: CPT | Mod: 25 | Performed by: PODIATRIST

## 2023-08-02 PROCEDURE — 92015 DETERMINE REFRACTIVE STATE: CPT | Mod: GY

## 2023-08-02 ASSESSMENT — VISUAL ACUITY
OS_CC: 20/30
OD_CC: 20/50
OD_CC: 20/50
OS_CC: 20/30
CORRECTION_TYPE: GLASSES
OD_SC: 20/50
OS_SC+: +2
OS_CC+: -2
OD_PH_CC: 20/40
OD_SC+: -1
OS_SC: 20/40
METHOD: SNELLEN - LINEAR
OD_CC+: +2

## 2023-08-02 ASSESSMENT — CONF VISUAL FIELD
OS_SUPERIOR_TEMPORAL_RESTRICTION: 0
OS_NORMAL: 1
OS_SUPERIOR_NASAL_RESTRICTION: 0
OD_SUPERIOR_NASAL_RESTRICTION: 0
OS_INFERIOR_TEMPORAL_RESTRICTION: 0
OD_INFERIOR_TEMPORAL_RESTRICTION: 0
OD_NORMAL: 1
OD_INFERIOR_NASAL_RESTRICTION: 0
OS_INFERIOR_NASAL_RESTRICTION: 0
OD_SUPERIOR_TEMPORAL_RESTRICTION: 0

## 2023-08-02 ASSESSMENT — REFRACTION_MANIFEST
OD_ADD: +2.75
OS_SPHERE: -0.50
OD_CYLINDER: +1.00
OD_SPHERE: -0.50
OS_AXIS: 175
OD_AXIS: 010
OS_ADD: +2.75
OS_CYLINDER: +1.75

## 2023-08-02 ASSESSMENT — CUP TO DISC RATIO
OS_RATIO: 0.25
OD_RATIO: 0.25

## 2023-08-02 ASSESSMENT — EXTERNAL EXAM - RIGHT EYE: OD_EXAM: NORMAL

## 2023-08-02 ASSESSMENT — REFRACTION_WEARINGRX
OS_CYLINDER: +1.50
OD_SPHERE: -0.50
OS_ADD: +2.50
OD_ADD: +2.50
OD_AXIS: 010
OS_AXIS: 173
OD_CYLINDER: +0.75
OS_SPHERE: -0.50
SPECS_TYPE: BIFOCAL

## 2023-08-02 ASSESSMENT — TONOMETRY
IOP_METHOD: TONOPEN
OS_IOP_MMHG: 15
OD_IOP_MMHG: 12

## 2023-08-02 ASSESSMENT — EXTERNAL EXAM - LEFT EYE: OS_EXAM: NORMAL

## 2023-08-02 ASSESSMENT — SLIT LAMP EXAM - LIDS
COMMENTS: 1+ SCURF
COMMENTS: 1+ SCURF

## 2023-08-02 NOTE — PATIENT INSTRUCTIONS
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  Updated PAL glasses prescription for full-time wear.  Monitor annually.  T2DM: No retinopathy on today's exam.  Patient educated on condition. Stressed importance of close BS/BP monitoring, diet/exercise, medication compliance, and regular visits with PCP. Monitor annually.   Dry eyes: Patient educated on condition.  Initiate ATs 3-4x/daily (Systane, Refresh, Theratears, Blink) and daily warm compresses with Neyda mask for 10 minutes.  Monitor annually.

## 2023-08-02 NOTE — PROGRESS NOTES
Chief Complaint   Patient presents with    Diabetic Eye Exam     Accompanied by wife  Chief Complaint(s) and History of Present Illness(es)       Diabetic Eye Exam              Diabetes Type: Type 2 and taking oral medications    Duration: 10 years    Blood Sugars: is controlled                   Lab Results   Component Value Date    A1C 7.2 04/25/2023    A1C 6.9 09/30/2022            Last Eye Exam: Over a year ago   Dilated Previously: Yes    What are you currently using to see?  glasses    Distance Vision Acuity: Satisfied with vision    Near Vision Acuity: Satisfied with vision while reading and using computer with glasses    Eye Comfort: eyes are sore in the morning.   Do you use eye drops? : Yes: OTC in the morning.   Occupation or Hobbies: Likes to watch tv    Gregory Tongs - Optometric Assistant     Medical, surgical and family histories reviewed and updated 8/2/2023.       OBJECTIVE: See Ophthalmology exam    ASSESSMENT:    ICD-10-CM    1. Presbyopia  H52.4 REFRACTION     EYE EXAM (SIMPLE-NONBILLABLE)      2. Type 2 diabetes mellitus with retinopathy of both eyes, without long-term current use of insulin, macular edema presence unspecified, unspecified retinopathy severity (H)  E11.319 Adult Eye  Referral     EYE EXAM (SIMPLE-NONBILLABLE)      3. Dry eyes  H04.123 EYE EXAM (SIMPLE-NONBILLABLE)          PLAN:    Tunde Gutierrez aware  eye exam results will be sent to Sylvia Myers.  Patient Instructions   Presbyopia: BCVA 20/40 right eye s/p ERM peel, 20/25 left eye.  Updated PAL glasses prescription for full-time wear.  Monitor annually.  T2DM: No retinopathy on today's exam.  Patient educated on condition. Stressed importance of close BS/BP monitoring, diet/exercise, medication compliance, and regular visits with PCP. Monitor annually.   Dry eyes: Patient educated on condition.  Initiate ATs 3-4x/daily (Systane, Refresh, Theratears, Blink) and daily warm compresses with Neyda mask for 10  minutes.  Monitor annually.       Claudia Valencia, OD

## 2023-08-02 NOTE — PROGRESS NOTES
Past Medical History:   Diagnosis Date    Diabetes mellitus, type 2 (H)     Osteoarthritis of multiple joints      Patient Active Problem List   Diagnosis    Chronic constipation    Diabetes mellitus, type II (H)    Diabetic retinopathy without macular edema associated with diabetes mellitus due to underlying condition (H)    Hypertension    Morbid obesity (H)    Hyperlipidemia    Pain of right hand    Numbness and tingling in both hands    TIA (transient ischemic attack)     Past Surgical History:   Procedure Laterality Date    JOINT REPLACEMENT      ORTHOPEDIC SURGERY       Social History     Socioeconomic History    Marital status:      Spouse name: Not on file    Number of children: Not on file    Years of education: Not on file    Highest education level: Not on file   Occupational History    Not on file   Tobacco Use    Smoking status: Never    Smokeless tobacco: Never   Vaping Use    Vaping Use: Never used   Substance and Sexual Activity    Alcohol use: Not Currently    Drug use: Not on file    Sexual activity: Not Currently   Other Topics Concern    Not on file   Social History Narrative    Not on file     Social Determinants of Health     Financial Resource Strain: Not on file   Food Insecurity: Not on file   Transportation Needs: Not on file   Physical Activity: Not on file   Stress: Not on file   Social Connections: Not on file   Intimate Partner Violence: Not on file   Housing Stability: Not on file     No family history on file.      Lab Results   Component Value Date    A1C 7.2 04/25/2023    A1C 6.9 09/30/2022     Last Comprehensive Metabolic Panel:  Lab Results   Component Value Date     04/25/2023    POTASSIUM 4.9 04/25/2023    CHLORIDE 114 (H) 04/25/2023    CO2 23 04/25/2023    ANIONGAP 6 04/25/2023     (H) 04/25/2023    BUN 21 04/25/2023    CR 1.58 (H) 04/25/2023    GFRESTIMATED 43 (L) 04/25/2023    NANCY 8.8 04/25/2023       SUBJECTIVE FINDINGS:  An 83 year old returns to clinic  with his wife for Diabetic foot cares.  He relates he is doing well.  He relates no ulcers or sores since we have seen him last.  He could use his toenails cut.  He relates he has been intermittently using Ketoconazole cream on his toenails and not using it on his feet.  He got Diabetic shoes and inserts and those are good.  He relates to no numbness tingling or neuropathy in his feet but he does get it in his hands.     OBJECTIVE FINDINGS:  DP and PT are 1/4 bilaterally.  He has decreased hair growth bilaterally.  He has incurvated nails with some thickening, dystrophy and subungual debris to differing degrees bilaterally.  Has mild dry scaly skin in moccasin pattern bilaterally.  There is no erythema, no drainage, no odor, no calor bilaterally.  He has dorsally contracted digits bilaterally.  He has decreased ankle joint dorsiflexion bilaterally.  There are no gross tendon voids bilaterally.     ASSESSMENT AND PLAN:  Diabetes with Onychomycosis and foot deformities.  Tinea pedis bilaterally.  Diagnosis and treatment options discussed with him.  The toenails bilaterally were all debrided or reduced upon consent.  He also has chronic Kidney disease and some retinopathy as well.  I advised them on the ketoconazole cream use.  Continue Diabetic shoes and inserts and use discussed with them.  Return to clinic and see me in 2-3 months.  Previous notes reviewed.                  Moderate level of medical decision making.

## 2023-08-02 NOTE — LETTER
8/2/2023         RE: Tunde Gutierrez  [address]  [place] [address]        Dear Colleague,    Thank you for referring your patient, Tunde Gutierrez, to the [facility]. Please see a copy of my visit note below.    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 edema associated with diabetes mellitus due to underlying condition (H)     Hypertension     Morbid obesity (H)     Hyperlipidemia     Pain of right hand     Numbness and tingling in both hands     TIA (transient ischemic attack)     Past Surgical History:   Procedure Laterality Date     JOINT REPLACEMENT       ORTHOPEDIC SURGERY       Social History     Socioeconomic History     Marital status:      Spouse name: Not on file     Number of children: Not on file     Years of education: Not on file     Highest education level: Not on file   Occupational History     Not on file   Tobacco Use     Smoking status: Never     Smokeless tobacco: Never   Vaping Use     Vaping Use: Never used   Substance and Sexual Activity     Alcohol use: Not Currently     Drug use: Not on file     Sexual activity: Not Currently   Other Topics Concern     Not on file   Social History Narrative     Not on file     Social Determinants of Health     Financial Resource Strain: Not on file   Food Insecurity: Not on file   Transportation Needs: Not on file   Physical Activity: Not on file   Stress: Not on file   Social Connections: Not on file   Intimate Partner Violence: Not on file   Housing Stability: Not on file     No family history on file.      Lab Results   Component Value Date    A1C 7.2 04/25/2023    A1C 6.9 09/30/2022     Last Comprehensive Metabolic Panel:  Lab Results   Component Value Date     04/25/2023     POTASSIUM 4.9 04/25/2023    CHLORIDE 114 (H) 04/25/2023    CO2 23 04/25/2023    ANIONGAP 6 04/25/2023     (H) 04/25/2023    BUN 21 04/25/2023    CR 1.58 (H) 04/25/2023    GFRESTIMATED 43 (L) 04/25/2023    NANCY 8.8 04/25/2023       SUBJECTIVE FINDINGS:  An 83 year old returns to clinic with his wife for Diabetic foot cares.  He relates he is doing well.  He relates no ulcers or sores since we have seen him last.  He could use his toenails cut.  He relates he has been intermittently using Ketoconazole cream on his toenails and not using it on his feet.  He got Diabetic shoes and inserts and those are good.  He relates to no numbness tingling or neuropathy in his feet but he does get it in his hands.     OBJECTIVE FINDINGS:  DP and PT are 1/4 bilaterally.  He has decreased hair growth bilaterally.  He has incurvated nails with some thickening, dystrophy and subungual debris to differing degrees bilaterally.  Has mild dry scaly skin in moccasin pattern bilaterally.  There is no erythema, no drainage, no odor, no calor bilaterally.  He has dorsally contracted digits bilaterally.  He has decreased ankle joint dorsiflexion bilaterally.  There are no gross tendon voids bilaterally.     ASSESSMENT AND PLAN:  Diabetes with Onychomycosis and foot deformities.  Tinea pedis bilaterally.  Diagnosis and treatment options discussed with him.  The toenails bilaterally were all debrided or reduced upon consent.  He also has chronic Kidney disease and some retinopathy as well.  I advised them on the ketoconazole cream use.  Continue Diabetic shoes and inserts and use discussed with them.  Return to clinic and see me in 2-3 months.  Previous notes reviewed.                  Moderate level of medical decision making.      Again, thank you for allowing me to participate in the care of your patient.        Sincerely,        Mynor Staley DPM

## 2023-08-02 NOTE — LETTER
8/2/2023         RE: Tunde Downsjim  [address]  [facility] [address]        Dear Colleague,    Thank you for referring your patient, Tunde Gutierrez, to the [facility]. Please see a copy of my visit note below.    Chief Complaint   Patient presents with     Diabetic Eye Exam     Accompanied by wife  Chief Complaint(s) and History of Present Illness(es)       Diabetic Eye Exam              Diabetes Type: Type 2 and taking oral medications    Duration: 10 years    Blood Sugars: is controlled                   Lab Results   Component Value Date    A1C 7.2 04/25/2023    A1C 6.9 09/30/2022            Last Eye Exam: Over a year ago   Dilated Previously: Yes    What are you currently using to see?  glasses    Distance Vision Acuity: Satisfied with vision    Near Vision Acuity: Satisfied with vision while reading and using computer with glasses    Eye Comfort: eyes are sore in the morning.   Do you use eye drops? : Yes: OTC in the morning.   Occupation or Hobbies: Likes to watch tv    Gregory Vargas - Optometric Assistant     Medical, surgical and family histories reviewed and updated 8/2/2023.       OBJECTIVE: See Ophthalmology exam    ASSESSMENT:    ICD-10-CM    1. Presbyopia  H52.4 REFRACTION     EYE EXAM (SIMPLE-NONBILLABLE)      2. Type 2 diabetes mellitus with retinopathy of both eyes, without long-term current use of insulin, macular edema presence unspecified, unspecified retinopathy severity (H)  E11.319 Adult Eye  Referral     EYE EXAM (SIMPLE-NONBILLABLE)      3. Dry eyes  H04.123 EYE EXAM (SIMPLE-NONBILLABLE)          PLAN:    Tunde Gutierrez aware  eye exam results will be sent to Sylvia Myers.  Patient Instructions   Presbyopia: BCVA 20/40 right eye s/p ERM peel, 20/25 left eye.  Updated PAL glasses prescription for full-time wear.  Monitor annually.  T2DM: No retinopathy on today's exam.  Patient educated on condition. Stressed importance of  close BS/BP monitoring, diet/exercise, medication compliance, and regular visits with PCP. Monitor annually.   Dry eyes: Patient educated on condition.  Initiate ATs 3-4x/daily (Systane, Refresh, Theratears, Blink) and daily warm compresses with Neyda mask for 10 minutes.  Monitor annually.       Claudia Valencia, OD        Again, thank you for allowing me to participate in the care of your patient.        Sincerely,        Claudia Valencia, OD

## 2023-08-04 DIAGNOSIS — E53.8 VITAMIN B12 DEFICIENCY (NON ANEMIC): ICD-10-CM

## 2023-08-07 RX ORDER — CYANOCOBALAMIN 1000 UG/ML
1 INJECTION, SOLUTION INTRAMUSCULAR; SUBCUTANEOUS
Qty: 10 ML | Refills: 0 | Status: SHIPPED | OUTPATIENT
Start: 2023-08-07 | End: 2023-11-15

## 2023-08-18 DIAGNOSIS — E78.2 MIXED HYPERLIPIDEMIA: ICD-10-CM

## 2023-08-18 DIAGNOSIS — N40.0 BENIGN PROSTATIC HYPERPLASIA, UNSPECIFIED WHETHER LOWER URINARY TRACT SYMPTOMS PRESENT: ICD-10-CM

## 2023-08-18 RX ORDER — ALFUZOSIN HYDROCHLORIDE 10 MG/1
1 TABLET, EXTENDED RELEASE ORAL DAILY
Qty: 90 TABLET | Refills: 0 | Status: SHIPPED | OUTPATIENT
Start: 2023-08-18 | End: 2023-10-13

## 2023-08-18 RX ORDER — ROSUVASTATIN CALCIUM 20 MG/1
20 TABLET, COATED ORAL DAILY
Qty: 90 TABLET | Refills: 0 | Status: SHIPPED | OUTPATIENT
Start: 2023-08-18 | End: 2023-10-13

## 2023-08-26 ENCOUNTER — HEALTH MAINTENANCE LETTER (OUTPATIENT)
Age: 83
End: 2023-08-26

## 2023-08-30 NOTE — NURSING NOTE
Both ear lavage completed in clinic.  Results are successful.    CALVIN Garcia MA         Caller: Anjali Quiles     [facility] [place] [id number] - [phone] [facility] [phone] 

## 2023-09-15 DIAGNOSIS — I10 BENIGN ESSENTIAL HYPERTENSION: ICD-10-CM

## 2023-09-15 DIAGNOSIS — E78.2 MIXED HYPERLIPIDEMIA: ICD-10-CM

## 2023-09-15 DIAGNOSIS — E08.319 DIABETIC RETINOPATHY WITHOUT MACULAR EDEMA ASSOCIATED WITH DIABETES MELLITUS DUE TO UNDERLYING CONDITION, UNSPECIFIED LATERALITY, UNSPECIFIED RETINOPATHY SEVERITY (H): ICD-10-CM

## 2023-09-19 RX ORDER — EZETIMIBE 10 MG/1
10 TABLET ORAL DAILY
Qty: 90 TABLET | Refills: 3 | Status: SHIPPED | OUTPATIENT
Start: 2023-09-19 | End: 2023-10-13

## 2023-09-19 RX ORDER — SITAGLIPTIN AND METFORMIN HYDROCHLORIDE 1000; 50 MG/1; MG/1
1 TABLET, FILM COATED ORAL DAILY
Qty: 90 TABLET | Refills: 3 | Status: SHIPPED | OUTPATIENT
Start: 2023-09-19 | End: 2024-09-20

## 2023-09-19 RX ORDER — SPIRONOLACTONE 25 MG/1
25 TABLET ORAL DAILY
Qty: 90 TABLET | Refills: 3 | Status: SHIPPED | OUTPATIENT
Start: 2023-09-19 | End: 2024-10-02

## 2023-10-13 ENCOUNTER — OFFICE VISIT (OUTPATIENT)
Dept: FAMILY MEDICINE | Facility: CLINIC | Age: 83
End: 2023-10-13
Payer: MEDICARE

## 2023-10-13 VITALS
TEMPERATURE: 97.5 F | HEART RATE: 71 BPM | RESPIRATION RATE: 15 BRPM | OXYGEN SATURATION: 96 % | DIASTOLIC BLOOD PRESSURE: 62 MMHG | SYSTOLIC BLOOD PRESSURE: 115 MMHG

## 2023-10-13 DIAGNOSIS — Z23 ENCOUNTER FOR IMMUNIZATION: ICD-10-CM

## 2023-10-13 DIAGNOSIS — E55.9 VITAMIN D DEFICIENCY: ICD-10-CM

## 2023-10-13 DIAGNOSIS — L82.0 INFLAMED SEBORRHEIC KERATOSIS: ICD-10-CM

## 2023-10-13 DIAGNOSIS — E66.01 MORBID OBESITY (H): ICD-10-CM

## 2023-10-13 DIAGNOSIS — R20.0 BILATERAL HAND NUMBNESS: ICD-10-CM

## 2023-10-13 DIAGNOSIS — Z00.00 ENCOUNTER FOR MEDICARE ANNUAL WELLNESS EXAM: Primary | ICD-10-CM

## 2023-10-13 DIAGNOSIS — G45.9 TIA (TRANSIENT ISCHEMIC ATTACK): ICD-10-CM

## 2023-10-13 DIAGNOSIS — N18.30 CKD STAGE 3 DUE TO TYPE 2 DIABETES MELLITUS (H): ICD-10-CM

## 2023-10-13 DIAGNOSIS — N40.0 BENIGN PROSTATIC HYPERPLASIA, UNSPECIFIED WHETHER LOWER URINARY TRACT SYMPTOMS PRESENT: ICD-10-CM

## 2023-10-13 DIAGNOSIS — E53.8 VITAMIN B12 DEFICIENCY (NON ANEMIC): ICD-10-CM

## 2023-10-13 DIAGNOSIS — E11.319 TYPE 2 DIABETES MELLITUS WITH RETINOPATHY OF BOTH EYES, WITHOUT LONG-TERM CURRENT USE OF INSULIN, MACULAR EDEMA PRESENCE UNSPECIFIED, UNSPECIFIED RETINOPATHY SEVERITY (H): ICD-10-CM

## 2023-10-13 DIAGNOSIS — I10 BENIGN ESSENTIAL HYPERTENSION: ICD-10-CM

## 2023-10-13 DIAGNOSIS — E78.2 MIXED HYPERLIPIDEMIA: ICD-10-CM

## 2023-10-13 DIAGNOSIS — F41.9 ANXIETY: ICD-10-CM

## 2023-10-13 DIAGNOSIS — E11.22 CKD STAGE 3 DUE TO TYPE 2 DIABETES MELLITUS (H): ICD-10-CM

## 2023-10-13 DIAGNOSIS — R05.3 CHRONIC COUGH: ICD-10-CM

## 2023-10-13 LAB
ERYTHROCYTE [DISTWIDTH] IN BLOOD BY AUTOMATED COUNT: 12.8 % (ref 10–15)
HBA1C MFR BLD: 7.6 % (ref 0–5.6)
HCT VFR BLD AUTO: 37.9 % (ref 40–53)
HGB BLD-MCNC: 12.6 G/DL (ref 13.3–17.7)
MCH RBC QN AUTO: 32.1 PG (ref 26.5–33)
MCHC RBC AUTO-ENTMCNC: 33.2 G/DL (ref 31.5–36.5)
MCV RBC AUTO: 96 FL (ref 78–100)
PLATELET # BLD AUTO: 173 10E3/UL (ref 150–450)
RBC # BLD AUTO: 3.93 10E6/UL (ref 4.4–5.9)
WBC # BLD AUTO: 8.2 10E3/UL (ref 4–11)

## 2023-10-13 PROCEDURE — 36415 COLL VENOUS BLD VENIPUNCTURE: CPT | Performed by: PREVENTIVE MEDICINE

## 2023-10-13 PROCEDURE — 84443 ASSAY THYROID STIM HORMONE: CPT | Performed by: PREVENTIVE MEDICINE

## 2023-10-13 PROCEDURE — 83036 HEMOGLOBIN GLYCOSYLATED A1C: CPT | Performed by: PREVENTIVE MEDICINE

## 2023-10-13 PROCEDURE — G0439 PPPS, SUBSEQ VISIT: HCPCS | Performed by: PREVENTIVE MEDICINE

## 2023-10-13 PROCEDURE — 90480 ADMN SARSCOV2 VAC 1/ONLY CMP: CPT | Performed by: PREVENTIVE MEDICINE

## 2023-10-13 PROCEDURE — 85027 COMPLETE CBC AUTOMATED: CPT | Performed by: PREVENTIVE MEDICINE

## 2023-10-13 PROCEDURE — 91320 SARSCV2 VAC 30MCG TRS-SUC IM: CPT | Performed by: PREVENTIVE MEDICINE

## 2023-10-13 PROCEDURE — 82570 ASSAY OF URINE CREATININE: CPT | Performed by: PREVENTIVE MEDICINE

## 2023-10-13 PROCEDURE — 80061 LIPID PANEL: CPT | Performed by: PREVENTIVE MEDICINE

## 2023-10-13 PROCEDURE — 82607 VITAMIN B-12: CPT | Performed by: PREVENTIVE MEDICINE

## 2023-10-13 PROCEDURE — 90662 IIV NO PRSV INCREASED AG IM: CPT | Performed by: PREVENTIVE MEDICINE

## 2023-10-13 PROCEDURE — 99214 OFFICE O/P EST MOD 30 MIN: CPT | Mod: 25 | Performed by: PREVENTIVE MEDICINE

## 2023-10-13 PROCEDURE — 80053 COMPREHEN METABOLIC PANEL: CPT | Performed by: PREVENTIVE MEDICINE

## 2023-10-13 PROCEDURE — G0008 ADMIN INFLUENZA VIRUS VAC: HCPCS | Mod: 59 | Performed by: PREVENTIVE MEDICINE

## 2023-10-13 PROCEDURE — 82043 UR ALBUMIN QUANTITATIVE: CPT | Performed by: PREVENTIVE MEDICINE

## 2023-10-13 RX ORDER — ALFUZOSIN HYDROCHLORIDE 10 MG/1
1 TABLET, EXTENDED RELEASE ORAL DAILY
Qty: 90 TABLET | Refills: 3 | Status: SHIPPED | OUTPATIENT
Start: 2023-10-13 | End: 2024-10-04

## 2023-10-13 RX ORDER — BENZONATATE 200 MG/1
200 CAPSULE ORAL 3 TIMES DAILY PRN
Qty: 30 CAPSULE | Refills: 3 | Status: SHIPPED | OUTPATIENT
Start: 2023-10-13 | End: 2024-10-04

## 2023-10-13 RX ORDER — ROSUVASTATIN CALCIUM 20 MG/1
20 TABLET, COATED ORAL DAILY
Qty: 90 TABLET | Refills: 3 | Status: SHIPPED | OUTPATIENT
Start: 2023-10-13 | End: 2024-10-04

## 2023-10-13 RX ORDER — RESPIRATORY SYNCYTIAL VIRUS VACCINE 120MCG/0.5
0.5 KIT INTRAMUSCULAR ONCE
Qty: 1 EACH | Refills: 0 | Status: SHIPPED | OUTPATIENT
Start: 2023-10-13 | End: 2023-10-13

## 2023-10-13 ASSESSMENT — ENCOUNTER SYMPTOMS
HEMATOCHEZIA: 0
ABDOMINAL PAIN: 0
FREQUENCY: 1
JOINT SWELLING: 0
SHORTNESS OF BREATH: 0
COUGH: 1
MYALGIAS: 1
NERVOUS/ANXIOUS: 0
DIARRHEA: 0
HEMATURIA: 0
WEAKNESS: 0
SORE THROAT: 0
EYE PAIN: 0
HEARTBURN: 0
HEADACHES: 0
CONSTIPATION: 0
DYSURIA: 0
FEVER: 0
CHILLS: 0
ARTHRALGIAS: 1
NAUSEA: 0
DIZZINESS: 0
PALPITATIONS: 0
PARESTHESIAS: 0

## 2023-10-13 ASSESSMENT — ACTIVITIES OF DAILY LIVING (ADL)
CURRENT_FUNCTION: HOUSEWORK REQUIRES ASSISTANCE
CURRENT_FUNCTION: PREPARING MEALS REQUIRES ASSISTANCE
CURRENT_FUNCTION: BATHING REQUIRES ASSISTANCE
CURRENT_FUNCTION: LAUNDRY REQUIRES ASSISTANCE
CURRENT_FUNCTION: TRANSPORTATION REQUIRES ASSISTANCE
CURRENT_FUNCTION: SHOPPING REQUIRES ASSISTANCE
CURRENT_FUNCTION: TELEPHONE REQUIRES ASSISTANCE
CURRENT_FUNCTION: MEDICATION ADMINISTRATION REQUIRES ASSISTANCE

## 2023-10-13 NOTE — PATIENT INSTRUCTIONS
Patient Education   Personalized Prevention Plan  You are due for the preventive services outlined below.  Your care team is available to assist you in scheduling these services.  If you have already completed any of these items, please share that information with your care team to update in your medical record.  Health Maintenance Due   Topic Date Due     Diabetic Foot Exam  Never done     Hepatitis B Vaccine (1 of 3 - Risk 3-dose series) Never done     RSV VACCINE 60+ (1 - 1-dose 60+ series) Never done     Diptheria Tetanus Pertussis (DTAP/TDAP/TD) Vaccine (1 - Tdap) 06/25/2022     Annual Wellness Visit  05/05/2023     A1C Lab  07/25/2023     Flu Vaccine (1) 09/01/2023     COVID-19 Vaccine (7 - 2023-24 season) 09/05/2023     Kidney Microalbumin Urine Test  09/30/2023     Your Health Risk Assessment indicates you feel you are not in good health    A healthy lifestyle helps keep the body fit and the mind alert. It helps protect you from disease, helps you fight disease, and helps prevent chronic disease (disease that doesn't go away) from getting worse. This is important as you get older and begin to notice twinges in muscles and joints and a decline in the strength and stamina you once took for granted. A healthy lifestyle includes good healthcare, good nutrition, weight control, recreation, and regular exercise. Avoid harmful substances and do what you can to keep safe. Another part of a healthy lifestyle is stay mentally active and socially involved.    Good healthcare   Have a wellness visit every year.   If you have new symptoms, let us know right away. Don't wait until the next checkup.   Take medicines exactly as prescribed and keep your medicines in a safe place. Tell us if your medicine causes problems.   Healthy diet and weight control   Eat 3 or 4 small, nutritious, low-fat, high-fiber meals a day. Include a variety of fruits, vegetables, and whole-grain foods.   Make sure you get enough calcium in your  diet. Calcium, vitamin D, and exercise help prevent osteoporosis (bone thinning).   If you live alone, try eating with others when you can. That way you get a good meal and have company while you eat it.   Try to keep a healthy weight. If you eat more calories than your body uses for energy, it will be stored as fat and you will gain weight.     Recreation   Recreation is not limited to sports and team events. It includes any activity that provides relaxation, interest, enjoyment, and exercise. Recreation provides an outlet for physical, mental, and social energy. It can give a sense of worth and achievement. It can help you stay healthy.    Mental Exercise and Social Involvement  Mental and emotional health is as important as physical health. Keep in touch with friends and family. Stay as active as possible. Continue to learn and challenge yourself.   Things you can do to stay mentally active are:  Learn something new, like a foreign language or musical instrument.   Play SCRABBLE or do crossword puzzles. If you cannot find people to play these games with you at home, you can play them with others on your computer through the Internet.   Join a games club--anything from card games to chess or checkers or lawn bowling.   Start a new hobby.   Go back to school.   Volunteer.   Read.   Keep up with world events.  Activities of Daily Living    Your Health Risk Assessment indicates you have difficulties with activities of daily living such as housework, bathing, preparing meals, taking medication, etc. Please make a follow up appointment for us to address this issue in more detail.  Bladder Training: Care Instructions  Your Care Instructions     Bladder training is used to treat urge incontinence and stress incontinence. Urge incontinence means that the need to urinate comes on so fast that you can't get to a toilet in time. Stress incontinence means that you leak urine because of pressure on your bladder. For example,  it may happen when you laugh, cough, or lift something heavy.  Bladder training can increase how long you can wait before you have to urinate. It can also help your bladder hold more urine. And it can give you better control over the urge to urinate.  It is important to remember that bladder training takes a few weeks to a few months to make a difference. You may not see results right away, but don't give up.  Follow-up care is a key part of your treatment and safety. Be sure to make and go to all appointments, and call your doctor if you are having problems. It's also a good idea to know your test results and keep a list of the medicines you take.  How can you care for yourself at home?  Work with your doctor to come up with a bladder training program that is right for you. You may use one or more of the following methods.  Delayed urination  In the beginning, try to keep from urinating for 5 minutes after you first feel the need to go.  While you wait, take deep, slow breaths to relax. Kegel exercises can also help you delay the need to go to the bathroom.  After some practice, when you can easily wait 5 minutes to urinate, try to wait 10 minutes before you urinate.  Slowly increase the waiting period until you are able to control when you have to urinate.  Scheduled urination  Empty your bladder when you first wake up in the morning.  Schedule times throughout the day when you will urinate.  Start by going to the bathroom every hour, even if you don't need to go.  Slowly increase the time between trips to the bathroom.  When you have found a schedule that works well for you, keep doing it.  If you wake up during the night and have to urinate, do it. Apply your schedule to waking hours only.  Kegel exercises  These tighten and strengthen pelvic muscles, which can help you control the flow of urine. (If doing these exercises causes pain, stop doing them and talk with your doctor.) To do Kegel exercises:  Squeeze  "your muscles as if you were trying not to pass gas. Or squeeze your muscles as if you were stopping the flow of urine. Your belly, legs, and buttocks shouldn't move.  Hold the squeeze for 3 seconds, then relax for 5 to 10 seconds.  Start with 3 seconds, then add 1 second each week until you are able to squeeze for 10 seconds.  Repeat the exercise 10 times a session. Do 3 to 8 sessions a day.  When should you call for help?  Watch closely for changes in your health, and be sure to contact your doctor if:    Your incontinence is getting worse.     You do not get better as expected.   Where can you learn more?  Go to https://www.GetNotes.net/patiented  Enter V684 in the search box to learn more about \"Bladder Training: Care Instructions.\"  Current as of: March 1, 2023               Content Version: 13.7    7279-2728 [employer].   Care instructions adapted under license by your healthcare professional. If you have questions about a medical condition or this instruction, always ask your healthcare professional. [employer] disclaims any warranty or liability for your use of this information.      Your Health Risk Assessment indicates you feel you are not in good emotional health.    Recreation   Recreation is not limited to sports and team events. It includes any activity that provides relaxation, interest, enjoyment, and exercise. Recreation provides an outlet for physical, mental, and social energy. It can give a sense of worth and achievement. It can help you stay healthy.    Mental Exercise and Social Involvement  Mental and emotional health is as important as physical health. Keep in touch with friends and family. Stay as active as possible. Continue to learn and challenge yourself.   Things you can do to stay mentally active are:  Learn something new, like a foreign language or musical instrument.   Play SCRABBLE or do crossword puzzles. If you cannot find people to play these games " with you at home, you can play them with others on your computer through the Internet.   Join a games club--anything from card games to chess or checkers or lawn bowling.   Start a new hobby.   Go back to school.   Volunteer.   Read.   Keep up with world events.  Preventing Falls: Care Instructions    Talk to your doctor about the medicines you take. Ask if any of them increase the risk of falls and whether they can be changed or stopped.   Try to exercise regularly. It can help improve your strength and balance. This can help lower your risk of falling.     Practice fall safety and prevention.    Wear low-heeled shoes that fit well and give your feet good support. Talk to your doctor if you have foot problems that make this hard.  Carry a cellphone or wear a medical alert device that you can use to call for help.  Use stepladders instead of chairs to reach high objects. Don't climb if you're at risk for falls. Ask for help, if needed.  Wear the correct eyeglasses, if you need them.    Make your home safer.    Remove rugs, cords, clutter, and furniture from walkways.  Keep your house well lit. Use night-lights in hallways and bathrooms.  Install and use sturdy handrails on stairways.  Wear nonskid footwear, even inside. Don't walk barefoot or in socks without shoes.    Be safe outside.    Use handrails, curb cuts, and ramps whenever possible.  Keep your hands free by using a shoulder bag or backpack.  Try to walk in well-lit areas. Watch out for uneven ground, changes in pavement, and debris.  Be careful in the winter. Walk on the grass or gravel when sidewalks are slippery. Use de-icer on steps and walkways. Add non-slip devices to shoes.    Put grab bars and nonskid mats in your shower or tub and near the toilet. Try to use a shower chair or bath bench when bathing.   Get into a tub or shower by putting in your weaker leg first. Get out with your strong side first. Have a phone or medical alert device in the  "bathroom with you.   Where can you learn more?  Go to https://www.[employer].net/patiented  Enter G117 in the search box to learn more about \"Preventing Falls: Care Instructions.\"  Current as of: November 9, 2022               Content Version: 13.7    5024-9846 [email or web address].   Care instructions adapted under license by your healthcare professional. If you have questions about a medical condition or this instruction, always ask your healthcare professional. [email or web address] disclaims any warranty or liability for your use of this information.      How to Get Up Safely After a Fall: Care Instructions  Overview     If you have injuries, health problems, or other reasons that may make it easy for you to fall at home, it is a good idea to learn how to get up safely after a fall. Learning how to get up correctly can help you avoid making an injury worse.  Also, knowing what to do if you cannot get up can help you stay safe until help arrives.  Follow-up care is a key part of your treatment and safety. Be sure to make and go to all appointments, and call your doctor if you are having problems. It's also a good idea to know your test results and keep a list of the medicines you take.  How can you care for yourself after a fall?  If you think you can get up  First lie still for a few minutes and think about how you feel. If your body feels okay and you think you can get up safely, follow the rest of the steps below:  Look for a chair or other piece of furniture that is close to you.  Roll onto your side and rest. Roll by turning your head in the direction you want to roll, move your shoulder and arm, then hip and leg in the same direction.  Lie still for a moment to let your blood pressure adjust.  Slowly push your upper body up, lift your head, and take a moment to rest.  Slowly get up on your hands and knees, and crawl to the chair or other stable piece of furniture.  Put your hands on the chair.  Move " "one foot forward, and place it flat on the floor. Your other leg should be bent with the knee on the floor.  Rise slowly, turn your body, and sit in the chair. Stay seated for a bit and think about how you feel. Call for help. Even if you feel okay, let someone know what happened to you. You might not know that you have a serious injury.  If you cannot get up  If you think you are injured after a fall or you cannot get up, try not to panic.  Call out for help.  If you have a phone within reach or you have an emergency call device, use it to call for help.  If you do not have a phone within reach, try to slide yourself toward it. If you cannot get to the phone, try to slide toward a door or window or a place where you think you can be heard.  [place] or use an object to make noise so someone might hear you.  If you can reach something that you can use for a pillow, place it under your head. Try to stay warm by covering yourself with a blanket or clothing while you wait for help.  When should you call for help?   Call 911 anytime you think you may need emergency care. For example, call if:    You passed out (lost consciousness).     You cannot get up after a fall.     You have severe pain.   Call your doctor now or seek immediate medical care if:    You have new or worse pain.     You are dizzy or lightheaded.     You hit your head.   Watch closely for changes in your health, and be sure to contact your doctor if:    You do not get better as expected.   Where can you learn more?  Go to https://www.[employer].net/patiented  Enter G513 in the search box to learn more about \"How to Get Up Safely After a Fall: Care Instructions.\"  Current as of: November 14, 2022               Content Version: 13.7    4167-4947 [employer].   Care instructions adapted under license by your healthcare professional. If you have questions about a medical condition or this instruction, always ask your healthcare professional. " [employer] disclaims any warranty or liability for your use of this information.

## 2023-10-13 NOTE — PROGRESS NOTES
"SUBJECTIVE:   Tunde is a 83 year old who presents for Preventive Visit.      10/13/2023     1:25 PM   Additional Questions   Roomed by Javon ZAMORA   Accompanied by Christopher Quintero)       Are you in the first 12 months of your Medicare coverage?  No    Healthy Habits:     In general, how would you rate your overall health?  Fair    Frequency of exercise:  4-5 days/week    Duration of exercise:  15-30 minutes    Do you usually eat at least 4 servings of fruit and vegetables a day, include whole grains    & fiber and avoid regularly eating high fat or \"junk\" foods?  Yes    Taking medications regularly:  Yes    Medication side effects:  None    Ability to successfully perform activities of daily living:  Telephone requires assistance, transportation requires assistance, shopping requires assistance, preparing meals requires assistance, housework requires assistance, bathing requires assistance, laundry requires assistance and medication administration requires assistance    Home Safety:  Throw rugs in the hallway    Hearing Impairment:  No hearing concerns    In the past 6 months, have you been bothered by leaking of urine? Yes    In general, how would you rate your overall mental or emotional health?  Fair    Additional concerns today:  No      Today's PHQ-2 Score:       10/13/2023     1:20 PM   PHQ-2 ( 1999 [employer])   Q1: Little interest or pleasure in doing things 1   Q2: Feeling down, depressed or hopeless 1   PHQ-2 Score 2   Q1: Little interest or pleasure in doing things Several days   Q2: Feeling down, depressed or hopeless Several days   PHQ-2 Score 2           Have you ever done Advance Care Planning? (For example, a Health Directive, POLST, or a discussion with a medical provider or your loved ones about your wishes): Yes, advance care planning is on file.    Fall risk  Fallen 2 or more times in the past year?: Yes  Any fall with injury in the past year?: Yes    Cognitive Screening   1) Repeat 3 items (Leader, " Season, Table)    2) Clock draw: NORMAL  3) 3 item recall: Recalls 2 objects   Results: NORMAL clock, 1-2 items recalled: COGNITIVE IMPAIRMENT LESS LIKELY    Mini-CogTM Copyright ADINA Pratt. Licensed by the author for use in [facility]; reprinted with permission (addie@[facility]). All rights reserved.      Do you have sleep apnea, excessive snoring or daytime drowsiness? : no    Reviewed and updated as needed this visit by clinical staff   Tobacco  Allergies  Meds  Problems  Med Hx  Surg Hx  Fam Hx          Reviewed and updated as needed this visit by Provider   Tobacco  Allergies  Meds  Problems  Med Hx  Surg Hx  Fam Hx         Social History     Tobacco Use    Smoking status: Never    Smokeless tobacco: Never   Substance Use Topics    Alcohol use: Not Currently             10/13/2023     1:18 PM   Alcohol Use   Prescreen: >3 drinks/day or >7 drinks/week? No          No data to display              Do you have a current opioid prescription? No  Do you use any other controlled substances or medications that are not prescribed by a provider? None        Saw Podiatry 8/23  Saw EYE 8/23    Has had OT and PT for hands a couple of years ago.  Hands have been numb    is not so good  Does some home exercises    Has limited physical activity due to bilateral knee replacement and hip replacement     History of TIA:  -no new symptoms  -no chest pains   -no speech changes  -no memory changes      Hypertension:  -blood pressure at home can be checked      Anxiety:  -symptoms stable  -mood OK      B12 deficiency:  -improved symptoms  -on B 12 injections monthly, family does this for him      Vit D deficiency:   -taking supplement     Lipids:   -taking Zetia and Crestor  -no side effects      Coughs a lot at night and spits it out, does wheeze some if he lays on his back  Has a dry throat in the morning  Throat gets dry  No snoring  No post nasal drainage  No GERD   Has Mucinex at home and has not helped  some  No problems breathing  Symptoms for a few years       Diabetes:  -saw Podiatry 8/23  -130 to 150 mg/dl  -no low blood sugars         Chronic Kidney Disease Follow-up     Do you take any over the counter pain medicine?: No        Current providers sharing in care for this patient include:   Patient Care Team:  Sylvia Myers MD as PCP - General (Family Medicine)  Sylvia Myers MD as Assigned PCP  Catarino Taveras MD as Assigned Neuroscience Provider  Mynor Staley DPM as Assigned Musculoskeletal Provider  Claudia Valencia OD (Optometry)  Claudia Valencia OD as Assigned Surgical Provider    The following health maintenance items are reviewed in Epic and correct as of today:  Health Maintenance   Topic Date Due    DIABETIC FOOT EXAM  Never done    HEPATITIS B IMMUNIZATION (1 of 3 - Risk 3-dose series) Never done    RSV VACCINE 60+ (1 - 1-dose 60+ series) Never done    DTAP/TDAP/TD IMMUNIZATION (1 - Tdap) 06/25/2022    MEDICARE ANNUAL WELLNESS VISIT  05/05/2023    A1C  07/25/2023    INFLUENZA VACCINE (1) 09/01/2023    COVID-19 Vaccine (7 - 2023-24 season) 09/05/2023    MICROALBUMIN  09/30/2023    LIPID  02/13/2024    BMP  04/25/2024    ANNUAL REVIEW OF HM ORDERS  07/11/2024    EYE EXAM  08/02/2024    FALL RISK ASSESSMENT  10/13/2024    ADVANCE CARE PLANNING  10/13/2028    PHQ-2 (once per calendar year)  Completed    Pneumococcal Vaccine: 65+ Years  Completed    ZOSTER IMMUNIZATION  Completed    IPV IMMUNIZATION  Aged Out    HPV IMMUNIZATION  Aged Out    MENINGITIS IMMUNIZATION  Aged Out     Lab work is in process  Labs reviewed in EPIC  BP Readings from Last 3 Encounters:   10/13/23 115/62   07/21/23 100/56   07/11/23 112/62    Wt Readings from Last 3 Encounters:   07/21/23 105.8 kg (233 lb 3.2 oz)   07/11/23 102.3 kg (225 lb 9.6 oz)   04/25/23 108.9 kg (240 lb)                  Patient Active Problem List   Diagnosis    Chronic constipation    Diabetes mellitus, type II (H)    Diabetic retinopathy without  macular edema associated with diabetes mellitus due to underlying condition (H)    Hypertension    Morbid obesity (H)    Hyperlipidemia    Pain of right hand    Numbness and tingling in both hands    TIA (transient ischemic attack)     Past Surgical History:   Procedure Laterality Date    CATARACT IOL, RT/LT      JOINT REPLACEMENT      ORTHOPEDIC SURGERY         Social History     Tobacco Use    Smoking status: Never    Smokeless tobacco: Never   Substance Use Topics    Alcohol use: Not Currently     History reviewed. No pertinent family history.      Current Outpatient Medications   Medication Sig Dispense Refill    acetaminophen (TYLENOL) 500 MG tablet Take 500-1,000 mg by mouth every 6 hours as needed for mild pain      albuterol (PROAIR HFA/PROVENTIL HFA/VENTOLIN HFA) 108 (90 Base) MCG/ACT inhaler Inhale 2 puffs into the lungs every 6 hours as needed for shortness of breath, wheezing or cough 18 g 0    alcohol swab prep pads Use to swab area of injection/nina as directed. 100 each 3    alfuzosin ER (UROXATRAL) 10 MG 24 hr tablet Take 1 tablet (10 mg) by mouth daily 90 tablet 3    aspirin 81 MG EC tablet Take 81 mg by mouth daily      benzonatate (TESSALON) 200 MG capsule Take 1 capsule (200 mg) by mouth 3 times daily as needed for cough 30 capsule 3    blood glucose (NO BRAND SPECIFIED) test strip Use to test blood sugar 1 times daily or as directed. To accompany: Blood Glucose Monitor Brands: per insurance. 100 strip 6    blood glucose monitoring (NO BRAND SPECIFIED) meter device kit Use to test blood sugar 1 times daily or as directed. Preferred blood glucose meter OR supplies to accompany: Blood Glucose Monitor Brands: per insurance. 1 kit 0    cyanocobalamin (CYANOCOBALAMIN) 1000 MCG/ML injection Inject 1 mL (1,000 mcg) Subcutaneous every 7 days 10 mL 0    ezetimibe (ZETIA) 10 MG tablet TAKE 1 TABLET DAILY 90 tablet 3    JANUMET  MG tablet TAKE 1 TABLET DAILY 90 tablet 3    ketoconazole (NIZORAL) 2 %  "external cream Apply topically daily To feet and toenails. 60 g 5    losartan (COZAAR) 50 MG tablet TAKE 1 TABLET DAILY 90 tablet 1    meloxicam (MOBIC) 7.5 MG tablet Take 1 tablet (7.5 mg) by mouth daily 90 tablet 3    respiratory syncytial virus vaccine, bivalent (ABRYSVO) injection Inject 0.5 mLs into the muscle once for 1 dose 1 each 0    rosuvastatin (CRESTOR) 20 MG tablet Take 1 tablet (20 mg) by mouth daily 90 tablet 3    sertraline (ZOLOFT) 25 MG tablet Take 1 tablet (25 mg) by mouth once daily for Anxiety 90 tablet 2    spironolactone (ALDACTONE) 25 MG tablet TAKE 1 TABLET DAILY 90 tablet 3    syringe/needle, disp, (BD ECLIPSE SYRINGE) 25G X 1\" 3 ML MISC Use once a week for B 12 injections 50 each 0    thin (NO BRAND SPECIFIED) lancets Use to test blood sugar 1 times daily or as directed. To accompany: Blood Glucose Monitor Brands: per insurance. 100 each 6    VITAMIN D (ERGOCALCIFEROL) PO Take by mouth daily       Allergies   Allergen Reactions    Ace Inhibitors Cough     Recent Labs   Lab Test 10/13/23  1428 04/25/23  1613 02/13/23  1025 01/11/23  0945 11/30/22  1000 09/30/22  1220 09/30/22  1220   A1C 7.6* 7.2*  --   --   --   --  6.9*   LDL  --   --  37 33 47  --   --    HDL  --   --  32* 31* 36*  --   --    TRIG  --   --  251* 175* 127  --   --    CR  --  1.58* 1.45* 1.48* 1.26*  --  1.52*   GFRESTIMATED  --  43* 48* 46* 56*  --  45*   POTASSIUM  --  4.9 5.4* 4.8 4.9   < > 4.6    < > = values in this interval not displayed.        Review of Systems   Constitutional:  Negative for chills and fever.   HENT:  Positive for congestion. Negative for ear pain, hearing loss and sore throat.    Eyes:  Negative for pain and visual disturbance.   Respiratory:  Positive for cough. Negative for shortness of breath.    Cardiovascular:  Negative for chest pain, palpitations and peripheral edema.   Gastrointestinal:  Negative for abdominal pain, constipation, diarrhea, heartburn, hematochezia and nausea. " "  Genitourinary:  Positive for frequency and urgency. Negative for dysuria, genital sores and hematuria.   Musculoskeletal:  Positive for arthralgias and myalgias. Negative for joint swelling.   Skin:  Negative for rash.   Neurological:  Negative for dizziness, weakness, headaches and paresthesias.   Psychiatric/Behavioral:  Negative for mood changes. The patient is not nervous/anxious.          OBJECTIVE:   /62 (BP Location: Right arm, Patient Position: Sitting, Cuff Size: Adult Regular)   Pulse 71   Temp 97.5  F (36.4  C) (Oral)   Resp 15   SpO2 96%  Estimated body mass index is 35.34 kg/m  as calculated from the following:    Height as of 7/21/23: 1.73 m (5' 8.11\").    Weight as of 7/21/23: 105.8 kg (233 lb 3.2 oz).  Physical Exam  GENERAL APPEARANCE: healthy, alert and no distress, seated in a wheelchair   EYES: Eyes grossly normal to inspection and conjunctivae and sclerae normal  RESP: lungs clear to auscultation - no rales, rhonchi or wheezes  CV: regular rates and rhythm, normal S1 S2  ABDOMEN: soft, non-tender and no rebound or guarding   MS: no edema on the lower extremities   SKIN: large patch of seborrheic keratosis noted on the right low to md back   NEURO: Normal strength and tone, mentation intact and speech normal  PSYCH: mentation appears normal      Diagnostic Test Results:  Labs reviewed in Epic  Results for orders placed or performed in visit on 10/13/23 (from the past 24 hour(s))   HEMOGLOBIN A1C   Result Value Ref Range    Hemoglobin A1C 7.6 (H) 0.0 - 5.6 %   CBC with platelets   Result Value Ref Range    WBC Count 8.2 4.0 - 11.0 10e3/uL    RBC Count 3.93 (L) 4.40 - 5.90 10e6/uL    Hemoglobin 12.6 (L) 13.3 - 17.7 g/dL    Hematocrit 37.9 (L) 40.0 - 53.0 %    MCV 96 78 - 100 fL    MCH 32.1 26.5 - 33.0 pg    MCHC 33.2 31.5 - 36.5 g/dL    RDW 12.8 10.0 - 15.0 %    Platelet Count 173 150 - 450 10e3/uL       ASSESSMENT / PLAN:   Tunde was seen today for annual visit, cough, mole and " neuropathy.    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)  -    Not using NSAIDs  -     Albumin Random Urine Quantitative with Creat Ratio  -     Comprehensive metabolic panel  -     CBC with platelets  -Blood pressure is at goal  -On losartan    Type 2 diabetes mellitus with retinopathy of both eyes, without long-term current use of insulin, macular edema presence unspecified, unspecified retinopathy severity (H)  -     Await labs  -     HEMOGLOBIN A1C  -     Albumin Random Urine Quantitative with Creat Ratio  -     Comprehensive metabolic panel  -     TSH with free T4 reflex  -Hemoglobin A1c is at goal at 7.6, would defer making medication changes at this time    Lab Results   Component Value Date    A1C 7.6 10/13/2023    A1C 7.2 04/25/2023    A1C 6.9 09/30/2022         Benign essential hypertension  -At goal    Mixed hyperlipidemia  -     Lipid panel reflex to direct LDL Non-fasting; Future  -     rosuvastatin (CRESTOR) 20 MG tablet; Take 1 tablet (20 mg) by mouth daily  -     Lipid panel reflex to direct LDL Non-fasting  -Tolerating rosuvastatin 20 mg and Zetia 10 mg daily    LDL Cholesterol Calculated   Date Value Ref Range Status   02/13/2023 37 <=100 mg/dL Final     -LDL is significantly low at 37, hence should discontinue Zetia    Vitamin D deficiency  -Vitamin D was normal at 52 (4/23)    Vitamin B12 deficiency (non anemic)  -     On monthly B12 injections  -     Vitamin B12    Encounter for immunization  -     INFLUENZA VACCINE 65+ (FLUZONE HD)  -     COVID-19 12+ (2023-24) (PFIZER)  -     respiratory syncytial virus vaccine, bivalent (ABRYSVO) injection; Inject 0.5 mLs into the muscle once for 1 dose    Anxiety  -Stable on sertraline 25 mg  "daily    Benign prostatic hyperplasia, unspecified whether lower urinary tract symptoms present  -     alfuzosin ER (UROXATRAL) 10 MG 24 hr tablet; Take 1 tablet (10 mg) by mouth daily    TIA (transient ischemic attack)  -No recurring symptoms  -No chest pain  -No episodes of slurred speech  -On aspirin 81 mg daily    Bilateral hand numbness  -     Occupational Therapy Referral; Future  -     Adult Neurology  Referral; Future  -Patient has had bilateral hand numbness for several years  -Per report, was evaluated for this in the past and was told he may have carpal tunnel syndrome  -Did undergo hand therapy at that time and found it to be somewhat useful  -Referral for hand therapy placed  -Referral for neurology placed  -Patient's main concern with hand numbness is diminished strength and decreased ability to     Inflamed seborrheic keratosis  -     Adult Dermatology  Referral; Future  -This lesion is changing and growing in size    Chronic cough  -     benzonatate (TESSALON) 200 MG capsule; Take 1 capsule (200 mg) by mouth 3 times daily as needed for cough  -Patient has had morning cough for many years  -No associated postnasal drainage or acid reflux  -Has tried Mucinex without improvement    Other orders  -     PRIMARY CARE FOLLOW-UP SCHEDULING; Future        COUNSELING:  Reviewed preventive health counseling, as reflected in patient instructions       Regular exercise       Healthy diet/nutrition       Vision screening       Fall risk prevention       Immunizations  Vaccinated for: Covid-19 and Influenza        BMI:   Estimated body mass index is 35.34 kg/m  as calculated from the following:    Height as of 7/21/23: 1.73 m (5' 8.11\").    Weight as of 7/21/23: 105.8 kg (233 lb 3.2 oz).   Weight management plan: Discussed healthy diet and exercise guidelines      He reports that he has never smoked. He has never used smokeless tobacco.      Appropriate preventive services were discussed with " this patient, including applicable screening as appropriate for fall prevention, nutrition, physical activity, Tobacco-use cessation, weight loss and cognition.  Checklist reviewing preventive services available has been given to the patient.    Reviewed patients plan of care and provided an AVS. The Basic Care Plan (routine screening as documented in Health Maintenance) for Tunde meets the Care Plan requirement. This Care Plan has been established and reviewed with the Patient.          Sylvia Myers MD MPH    [facility]    Identified Health Risks:  I have reviewed Opioid Use Disorder and Substance Use Disorder risk factors and made any needed referrals. The patient was provided with suggestions to help him develop a healthy physical lifestyle.  The patient reports that he has difficulty with activities of daily living. I have asked that the patient make a follow up appointment in 6 months where this issue will be further evaluated and addressed.  Information on urinary incontinence and treatment options given to patient.  The patient was provided with suggestions to help him develop a healthy emotional lifestyle.  He is at risk for falling and has been provided with information to reduce the risk of falling at home.

## 2023-10-14 LAB
ALBUMIN SERPL BCG-MCNC: 4.4 G/DL (ref 3.5–5.2)
ALP SERPL-CCNC: 75 U/L (ref 40–129)
ALT SERPL W P-5'-P-CCNC: 17 U/L (ref 0–70)
ANION GAP SERPL CALCULATED.3IONS-SCNC: 10 MMOL/L (ref 7–15)
AST SERPL W P-5'-P-CCNC: 17 U/L (ref 0–45)
BILIRUB SERPL-MCNC: 0.3 MG/DL
BUN SERPL-MCNC: 21.4 MG/DL (ref 8–23)
CALCIUM SERPL-MCNC: 9.1 MG/DL (ref 8.8–10.2)
CHLORIDE SERPL-SCNC: 110 MMOL/L (ref 98–107)
CHOLEST SERPL-MCNC: 113 MG/DL
CREAT SERPL-MCNC: 1.38 MG/DL (ref 0.67–1.17)
CREAT UR-MCNC: 156 MG/DL
DEPRECATED HCO3 PLAS-SCNC: 22 MMOL/L (ref 22–29)
EGFRCR SERPLBLD CKD-EPI 2021: 50 ML/MIN/1.73M2
GLUCOSE SERPL-MCNC: 159 MG/DL (ref 70–99)
HDLC SERPL-MCNC: 35 MG/DL
LDLC SERPL CALC-MCNC: 49 MG/DL
MICROALBUMIN UR-MCNC: 18.3 MG/L
MICROALBUMIN/CREAT UR: 11.73 MG/G CR (ref 0–17)
NONHDLC SERPL-MCNC: 78 MG/DL
POTASSIUM SERPL-SCNC: 4.6 MMOL/L (ref 3.4–5.3)
PROT SERPL-MCNC: 7 G/DL (ref 6.4–8.3)
SODIUM SERPL-SCNC: 142 MMOL/L (ref 135–145)
TRIGL SERPL-MCNC: 146 MG/DL
TSH SERPL DL<=0.005 MIU/L-ACNC: 2.73 UIU/ML (ref 0.3–4.2)
VIT B12 SERPL-MCNC: 816 PG/ML (ref 232–1245)

## 2023-10-16 NOTE — RESULT ENCOUNTER NOTE
Tunde,     LDL cholesterol is at goal for you.  Electrolytes and liver function tests are normal.  Kidney function is low but stable for you.  Three month glucose number increased from 7.2 to 7.6, goal is less than 8.  Blood counts are stable.  Urine sample is not showing any abnormal protein.  Vitamin B 12 and thyroid labs are normal.     Please do not hesitate to call us at [phone] if you have any questions or concerns.    Thank you,    Sylvia Myers MD MPH

## 2023-11-15 ENCOUNTER — MYC REFILL (OUTPATIENT)
Dept: FAMILY MEDICINE | Facility: CLINIC | Age: 83
End: 2023-11-15
Payer: MEDICARE

## 2023-11-15 DIAGNOSIS — E53.8 VITAMIN B12 DEFICIENCY (NON ANEMIC): ICD-10-CM

## 2023-11-16 RX ORDER — CYANOCOBALAMIN 1000 UG/ML
1 INJECTION, SOLUTION INTRAMUSCULAR; SUBCUTANEOUS
Qty: 10 ML | Refills: 1 | Status: SHIPPED | OUTPATIENT
Start: 2023-11-16 | End: 2024-01-02

## 2023-12-14 ENCOUNTER — MYC MEDICAL ADVICE (OUTPATIENT)
Dept: FAMILY MEDICINE | Facility: CLINIC | Age: 83
End: 2023-12-14
Payer: MEDICARE

## 2023-12-14 NOTE — TELEPHONE ENCOUNTER
Per chart review, provider discontinued zetia d/t significantly low LDL of 37 during 10/13/23 visit. Pt should only be taking Crestor per provider documentation.             Dorinda Myers RN

## 2024-01-02 ENCOUNTER — LAB (OUTPATIENT)
Dept: URGENT CARE | Facility: URGENT CARE | Age: 84
End: 2024-01-02
Attending: FAMILY MEDICINE
Payer: MEDICARE

## 2024-01-02 ENCOUNTER — VIRTUAL VISIT (OUTPATIENT)
Dept: FAMILY MEDICINE | Facility: CLINIC | Age: 84
End: 2024-01-02
Payer: MEDICARE

## 2024-01-02 DIAGNOSIS — J06.9 VIRAL URI WITH COUGH: Primary | ICD-10-CM

## 2024-01-02 DIAGNOSIS — J06.9 VIRAL URI WITH COUGH: ICD-10-CM

## 2024-01-02 LAB
FLUAV RNA SPEC QL NAA+PROBE: NEGATIVE
FLUBV RNA RESP QL NAA+PROBE: NEGATIVE
RSV RNA SPEC NAA+PROBE: NEGATIVE
SARS-COV-2 RNA RESP QL NAA+PROBE: NEGATIVE

## 2024-01-02 PROCEDURE — 99441 PR PHYSICIAN TELEPHONE EVALUATION 5-10 MIN: CPT | Mod: 93 | Performed by: FAMILY MEDICINE

## 2024-01-02 PROCEDURE — 99207 PR NO CHARGE LOS: CPT

## 2024-01-02 PROCEDURE — 87637 SARSCOV2&INF A&B&RSV AMP PRB: CPT

## 2024-01-02 RX ORDER — CODEINE PHOSPHATE AND GUAIFENESIN 10; 100 MG/5ML; MG/5ML
1-2 SOLUTION ORAL EVERY 4 HOURS PRN
Qty: 180 ML | Refills: 0 | Status: SHIPPED | OUTPATIENT
Start: 2024-01-02 | End: 2024-10-04

## 2024-01-02 NOTE — PROGRESS NOTES
Tunde is a 83 year old who is being evaluated via a billable telephone visit.      What phone number would you like to be contacted at? [phone]  How would you like to obtain your AVS? Tiffanie Henderson   Tunde is a 83 year old, presenting for the following health issues:  URI (History of chronic bronchitis- has had two negatives covid test last one taken about an hour ago -nothing seems to be helping with the cough- cough medicine with codeine tends to help) and Wheezing      1/2/2024     9:39 AM   Additional Questions   Roomed by Shama MADERA   Accompanied by Wife- Dianne     Upper Respiratory infection  H/o chronic bronchitis  Given tessalon  Duration 4day  Worst Symptoms: runny nose, cough, and wheezy cough  Runny nose?  Yes  Loss of Taste/ smell?  No  Sore throat?  Yes: productive  Cough/ productive or dry?  Yes- productive Tessalon Perles did not help at all  Fever?  No  HA/ achiness?  No  Current symptoms similar to those at the onset of this illness?  Yes  Relevant exposure hx?  No    Phone call duration: 8 minutes    Daughter had covid    1. Viral URI with cough  Requesting the only cough medicine that works, Robitussin with codeine.  Given age, counseled extreme caution with falling.   In the meantime, check for COVID, flu, RSV  - guaiFENesin-codeine (ROBITUSSIN AC) 100-10 MG/5ML solution; Take 5-10 mLs by mouth every 4 hours as needed for cough  Dispense: 180 mL; Refill: 0  - Symptomatic Influenza A/B, RSV, & SARS-CoV2 PCR (COVID-19); Future    Answers submitted by the patient for this visit:  General Questionnaire (Submitted on 1/2/2024)  Chief Complaint: Chronic problems general questions HPI Form  What is the reason for your visit today? : Cough, wheezing, history of bronchitis

## 2024-01-10 DIAGNOSIS — I10 BENIGN ESSENTIAL HYPERTENSION: ICD-10-CM

## 2024-01-10 RX ORDER — LOSARTAN POTASSIUM 50 MG/1
TABLET ORAL
Qty: 90 TABLET | Refills: 3 | Status: SHIPPED | OUTPATIENT
Start: 2024-01-10 | End: 2024-10-04

## 2024-02-06 ENCOUNTER — OFFICE VISIT (OUTPATIENT)
Dept: PODIATRY | Facility: CLINIC | Age: 84
End: 2024-02-06
Payer: MEDICARE

## 2024-02-06 DIAGNOSIS — E11.69 ONYCHOMYCOSIS OF MULTIPLE TOENAILS WITH TYPE 2 DIABETES MELLITUS (H): ICD-10-CM

## 2024-02-06 DIAGNOSIS — B35.3 TINEA PEDIS OF BOTH FEET: ICD-10-CM

## 2024-02-06 DIAGNOSIS — E11.49 TYPE II OR UNSPECIFIED TYPE DIABETES MELLITUS WITH NEUROLOGICAL MANIFESTATIONS, NOT STATED AS UNCONTROLLED(250.60) (H): ICD-10-CM

## 2024-02-06 DIAGNOSIS — B35.1 ONYCHOMYCOSIS OF MULTIPLE TOENAILS WITH TYPE 2 DIABETES MELLITUS (H): ICD-10-CM

## 2024-02-06 DIAGNOSIS — E11.69 TYPE 2 DIABETES MELLITUS WITH DIABETIC FOOT DEFORMITY (H): Primary | ICD-10-CM

## 2024-02-06 DIAGNOSIS — M21.969 TYPE 2 DIABETES MELLITUS WITH DIABETIC FOOT DEFORMITY (H): Primary | ICD-10-CM

## 2024-02-06 PROCEDURE — 11720 DEBRIDE NAIL 1-5: CPT | Performed by: PODIATRIST

## 2024-02-06 PROCEDURE — 99214 OFFICE O/P EST MOD 30 MIN: CPT | Mod: 25 | Performed by: PODIATRIST

## 2024-02-06 NOTE — NURSING NOTE
Tunde Gutierrez's chief complaint for this visit includes:  Chief Complaint   Patient presents with    Consult     Bilateral foot check and foot cares     PCP: Sylvia Myers    Referring Provider:  No referring provider defined for this encounter.    There were no vitals taken for this visit.  Data Unavailable     Do you need any medication refills at today's visit? NO    Allergies   Allergen Reactions    Ace Inhibitors Cough       Jane Key LPN

## 2024-02-06 NOTE — LETTER
2/6/2024         RE: Tunde Gutierrez  [address]  [place] [address]        Dear Colleague,    Thank you for referring your patient, Tunde Gutierrez, to the [facility]. Please see a copy of my visit note below.    Past Medical History:   Diagnosis Date     Diabetes mellitus, type 2 (H)      Hypertension      Osteoarthritis of multiple joints      Patient Active Problem List   Diagnosis     Chronic constipation     Diabetes mellitus, type II (H)     Diabetic retinopathy without macular edema associated with diabetes mellitus due to underlying condition (H)     Hypertension     Morbid obesity (H)     Hyperlipidemia     Pain of right hand     Numbness and tingling in both hands     TIA (transient ischemic attack)     Past Surgical History:   Procedure Laterality Date     CATARACT IOL, RT/LT       JOINT REPLACEMENT       ORTHOPEDIC SURGERY       Social History     Socioeconomic History     Marital status:      Spouse name: Not on file     Number of children: Not on file     Years of education: Not on file     Highest education level: Not on file   Occupational History     Not on file   Tobacco Use     Smoking status: Never     Passive exposure: Past     Smokeless tobacco: Never   Vaping Use     Vaping Use: Never used   Substance and Sexual Activity     Alcohol use: Not Currently     Drug use: Not on file     Sexual activity: Not Currently   Other Topics Concern     Not on file   Social History Narrative     Not on file     Social Determinants of Health     Financial Resource Strain: Low Risk  (1/2/2024)    Financial Resource Strain      Within the past 12 months, have you or your family members you live with been unable to get utilities (heat, electricity) when it was really needed?: No   Food Insecurity: Low Risk  (1/2/2024)    Food Insecurity      Within the past 12 months, did you worry that your food would run out before you got money to buy more?: No      Within  the past 12 months, did the food you bought just not last and you didn t have money to get more?: No   Transportation Needs: Low Risk  (1/2/2024)    Transportation Needs      Within the past 12 months, has lack of transportation kept you from medical appointments, getting your medicines, non-medical meetings or appointments, work, or from getting things that you need?: No   Physical Activity: Not on file   Stress: Not on file   Social Connections: Not on file   Interpersonal Safety: Not on file   Housing Stability: Low Risk  (1/2/2024)    Housing Stability      Do you have housing? : Yes      Are you worried about losing your housing?: No     No family history on file.        Lab Results   Component Value Date    A1C 7.6 10/13/2023    A1C 7.2 04/25/2023    A1C 6.9 09/30/2022       Last Comprehensive Metabolic Panel:  Lab Results   Component Value Date     10/13/2023    POTASSIUM 4.6 10/13/2023    CHLORIDE 110 (H) 10/13/2023    CO2 22 10/13/2023    ANIONGAP 10 10/13/2023     (H) 10/13/2023    BUN 21.4 10/13/2023    CR 1.38 (H) 10/13/2023    GFRESTIMATED 50 (L) 10/13/2023    NANCY 9.1 10/13/2023               Subjective findings- 83-year-old returns clinic in his wheelchair with his wife for diabetic foot cares.  Relates this week he caught his fifth toe on the shoe or bumped it is not sure but he is got a sore on the toenail on the right fifth toe, relates to no numbness tingling or neuropathy in his feet but he does have in his hands, relates to no ulcers since we seen him last, relates he is wearing his diabetic shoes and insoles, relates to intermittently using the ketoconazole cream.    Objective findings- DP and PT are 1 out of 4 bilaterally, has decreased hair growth bilaterally, CFT is less than 3 seconds bilaterally.  Has mild dry scaly skin moccasin pattern bilaterally.  Has thickened dystrophic brittle nails with subungual debris dystrophy discoloration to differing degrees bilaterally.  Has a  right fifth toenail its lifted up with distal nailbed with serosanguineous fluid-filled blister, no erythema, minimal edema, no odor, no calor, pain  on palpation.  Sharples decreased with 5.07 Symes Balbir monofilament bilaterally, deep tendon reflexes are intact bilaterally, proprioception is intact bilaterally.  He has peripheral edema bilaterally.  Mild distally contracted digits bilaterally.    Assessment and plan- Onychomycosis and Onychauxis bilaterally, mild Tinea Pedis bilaterally, Onycholysis with a blister right fifth toe nail.  Diabetes with peripheral Neuropathy, Diabetes with peripheral Vascular disease and Kidney disease.  Diagnosis and treatment options discussed with them.  All the toenails were debrided reduced bilaterally upon consent.  The right fifth toenail was debrided and the blister was incised and drained upon consent.  Local wound care with cleaning this with wound Vashe plan and Band-Aid done upon consent and use discussed with the patient.  Will have them clean this daily with wound Vashe and apply a Band-Aid.  Continue the Diabetic shoes.  Advised him on exercising.  Continue the Ketoconazole cream and use discussed with them.  Previous notes reviewed.  Return to clinic and see me in 1 week.                    Moderate level of medical decision making.      Again, thank you for allowing me to participate in the care of your patient.        Sincerely,        Mynor Staley DPM

## 2024-02-06 NOTE — PROGRESS NOTES
Past Medical History:   Diagnosis Date    Diabetes mellitus, type 2 (H)     Hypertension     Osteoarthritis of multiple joints      Patient Active Problem List   Diagnosis    Chronic constipation    Diabetes mellitus, type II (H)    Diabetic retinopathy without macular edema associated with diabetes mellitus due to underlying condition (H)    Hypertension    Morbid obesity (H)    Hyperlipidemia    Pain of right hand    Numbness and tingling in both hands    TIA (transient ischemic attack)     Past Surgical History:   Procedure Laterality Date    CATARACT IOL, RT/LT      JOINT REPLACEMENT      ORTHOPEDIC SURGERY       Social History     Socioeconomic History    Marital status:      Spouse name: Not on file    Number of children: Not on file    Years of education: Not on file    Highest education level: Not on file   Occupational History    Not on file   Tobacco Use    Smoking status: Never     Passive exposure: Past    Smokeless tobacco: Never   Vaping Use    Vaping Use: Never used   Substance and Sexual Activity    Alcohol use: Not Currently    Drug use: Not on file    Sexual activity: Not Currently   Other Topics Concern    Not on file   Social History Narrative    Not on file     Social Determinants of Health     Financial Resource Strain: Low Risk  (1/2/2024)    Financial Resource Strain     Within the past 12 months, have you or your family members you live with been unable to get utilities (heat, electricity) when it was really needed?: No   Food Insecurity: Low Risk  (1/2/2024)    Food Insecurity     Within the past 12 months, did you worry that your food would run out before you got money to buy more?: No     Within the past 12 months, did the food you bought just not last and you didn t have money to get more?: No   Transportation Needs: Low Risk  (1/2/2024)    Transportation Needs     Within the past 12 months, has lack of transportation kept you from medical appointments, getting your medicines,  non-medical meetings or appointments, work, or from getting things that you need?: No   Physical Activity: Not on file   Stress: Not on file   Social Connections: Not on file   Interpersonal Safety: Not on file   Housing Stability: Low Risk  (1/2/2024)    Housing Stability     Do you have housing? : Yes     Are you worried about losing your housing?: No     No family history on file.        Lab Results   Component Value Date    A1C 7.6 10/13/2023    A1C 7.2 04/25/2023    A1C 6.9 09/30/2022       Last Comprehensive Metabolic Panel:  Lab Results   Component Value Date     10/13/2023    POTASSIUM 4.6 10/13/2023    CHLORIDE 110 (H) 10/13/2023    CO2 22 10/13/2023    ANIONGAP 10 10/13/2023     (H) 10/13/2023    BUN 21.4 10/13/2023    CR 1.38 (H) 10/13/2023    GFRESTIMATED 50 (L) 10/13/2023    NANCY 9.1 10/13/2023               Subjective findings- 83-year-old returns clinic in his wheelchair with his wife for diabetic foot cares.  Relates this week he caught his fifth toe on the shoe or bumped it is not sure but he is got a sore on the toenail on the right fifth toe, relates to no numbness tingling or neuropathy in his feet but he does have in his hands, relates to no ulcers since we seen him last, relates he is wearing his diabetic shoes and insoles, relates to intermittently using the ketoconazole cream.    Objective findings- DP and PT are 1 out of 4 bilaterally, has decreased hair growth bilaterally, CFT is less than 3 seconds bilaterally.  Has mild dry scaly skin moccasin pattern bilaterally.  Has thickened dystrophic brittle nails with subungual debris dystrophy discoloration to differing degrees bilaterally.  Has a right fifth toenail its lifted up with distal nailbed with serosanguineous fluid-filled blister, no erythema, minimal edema, no odor, no calor, pain  on palpation.  Sharples decreased with 5.07 Symes Balbir monofilament bilaterally, deep tendon reflexes are intact bilaterally,  proprioception is intact bilaterally.  He has peripheral edema bilaterally.  Mild distally contracted digits bilaterally.    Assessment and plan- Onychomycosis and Onychauxis bilaterally, mild Tinea Pedis bilaterally, Onycholysis with a blister right fifth toe nail.  Diabetes with peripheral Neuropathy, Diabetes with peripheral Vascular disease and Kidney disease.  Diagnosis and treatment options discussed with them.  All the toenails were debrided reduced bilaterally upon consent.  The right fifth toenail was debrided and the blister was incised and drained upon consent.  Local wound care with cleaning this with wound Vashe plan and Band-Aid done upon consent and use discussed with the patient.  Will have them clean this daily with wound Vashe and apply a Band-Aid.  Continue the Diabetic shoes.  Advised him on exercising.  Continue the Ketoconazole cream and use discussed with them.  Previous notes reviewed.  Return to clinic and see me in 1 week.                    Moderate level of medical decision making.

## 2024-02-16 ENCOUNTER — OFFICE VISIT (OUTPATIENT)
Dept: PODIATRY | Facility: CLINIC | Age: 84
End: 2024-02-16
Payer: MEDICARE

## 2024-02-16 DIAGNOSIS — M21.969 TYPE 2 DIABETES MELLITUS WITH DIABETIC FOOT DEFORMITY (H): Primary | ICD-10-CM

## 2024-02-16 DIAGNOSIS — S90.424S BLISTER OF TOE OF RIGHT FOOT, SEQUELA: ICD-10-CM

## 2024-02-16 DIAGNOSIS — E11.69 TYPE 2 DIABETES MELLITUS WITH DIABETIC FOOT DEFORMITY (H): Primary | ICD-10-CM

## 2024-02-16 DIAGNOSIS — E11.49 TYPE II OR UNSPECIFIED TYPE DIABETES MELLITUS WITH NEUROLOGICAL MANIFESTATIONS, NOT STATED AS UNCONTROLLED(250.60) (H): ICD-10-CM

## 2024-02-16 DIAGNOSIS — N18.30 STAGE 3 CHRONIC KIDNEY DISEASE, UNSPECIFIED WHETHER STAGE 3A OR 3B CKD (H): ICD-10-CM

## 2024-02-16 PROCEDURE — 99213 OFFICE O/P EST LOW 20 MIN: CPT | Performed by: PODIATRIST

## 2024-02-16 NOTE — PROGRESS NOTES
Past Medical History:   Diagnosis Date    Diabetes mellitus, type 2 (H)     Hypertension     Osteoarthritis of multiple joints      Patient Active Problem List   Diagnosis    Chronic constipation    Diabetes mellitus, type II (H)    Diabetic retinopathy without macular edema associated with diabetes mellitus due to underlying condition (H)    Hypertension    Morbid obesity (H)    Hyperlipidemia    Pain of right hand    Numbness and tingling in both hands    TIA (transient ischemic attack)     Past Surgical History:   Procedure Laterality Date    CATARACT IOL, RT/LT      JOINT REPLACEMENT      ORTHOPEDIC SURGERY       Social History     Socioeconomic History    Marital status:      Spouse name: Not on file    Number of children: Not on file    Years of education: Not on file    Highest education level: Not on file   Occupational History    Not on file   Tobacco Use    Smoking status: Never     Passive exposure: Past    Smokeless tobacco: Never   Vaping Use    Vaping Use: Never used   Substance and Sexual Activity    Alcohol use: Not Currently    Drug use: Not on file    Sexual activity: Not Currently   Other Topics Concern    Not on file   Social History Narrative    Not on file     Social Determinants of Health     Financial Resource Strain: Low Risk  (1/2/2024)    Financial Resource Strain     Within the past 12 months, have you or your family members you live with been unable to get utilities (heat, electricity) when it was really needed?: No   Food Insecurity: Low Risk  (1/2/2024)    Food Insecurity     Within the past 12 months, did you worry that your food would run out before you got money to buy more?: No     Within the past 12 months, did the food you bought just not last and you didn t have money to get more?: No   Transportation Needs: Low Risk  (1/2/2024)    Transportation Needs     Within the past 12 months, has lack of transportation kept you from medical appointments, getting your medicines,  non-medical meetings or appointments, work, or from getting things that you need?: No   Physical Activity: Not on file   Stress: Not on file   Social Connections: Not on file   Interpersonal Safety: Not on file   Housing Stability: Low Risk  (1/2/2024)    Housing Stability     Do you have housing? : Yes     Are you worried about losing your housing?: No     No family history on file.      Subjective findings- 83-year-old returns clinic with his son for blister right fifth toenail.  Relates to doing the wound Vashe and a Band-Aid and is going well.    Objective findings- vascular status intact right.  Has a right fifth toenail and nailbed with no erythema, no drainage, no odor, no calor, no drainage, no pain on palpation.    Assessment and plan- Onycholysis with blister right fifth toenail this is healed well.  Diabetes with peripheral Neuropathy, Vascular disease and Kidney disease.  Diagnosis and treatment options discussed with them.  Okay to discontinue the wound Vashe and the Band-Aid as tolerated.  Return to clinic and see me in 2 to 3 months for Diabetic footcare.  Previous notes reviewed.              Low level of medical decision making.

## 2024-02-16 NOTE — LETTER
2/16/2024         RE: Tunde Gutierrez  [address]  [place] [address]        Dear Colleague,    Thank you for referring your patient, Tunde Gutierrez, to the [facility]. Please see a copy of my visit note below.    Past Medical History:   Diagnosis Date     Diabetes mellitus, type 2 (H)      Hypertension      Osteoarthritis of multiple joints      Patient Active Problem List   Diagnosis     Chronic constipation     Diabetes mellitus, type II (H)     Diabetic retinopathy without macular edema associated with diabetes mellitus due to underlying condition (H)     Hypertension     Morbid obesity (H)     Hyperlipidemia     Pain of right hand     Numbness and tingling in both hands     TIA (transient ischemic attack)     Past Surgical History:   Procedure Laterality Date     CATARACT IOL, RT/LT       JOINT REPLACEMENT       ORTHOPEDIC SURGERY       Social History     Socioeconomic History     Marital status:      Spouse name: Not on file     Number of children: Not on file     Years of education: Not on file     Highest education level: Not on file   Occupational History     Not on file   Tobacco Use     Smoking status: Never     Passive exposure: Past     Smokeless tobacco: Never   Vaping Use     Vaping Use: Never used   Substance and Sexual Activity     Alcohol use: Not Currently     Drug use: Not on file     Sexual activity: Not Currently   Other Topics Concern     Not on file   Social History Narrative     Not on file     Social Determinants of Health     Financial Resource Strain: Low Risk  (1/2/2024)    Financial Resource Strain      Within the past 12 months, have you or your family members you live with been unable to get utilities (heat, electricity) when it was really needed?: No   Food Insecurity: Low Risk  (1/2/2024)    Food Insecurity      Within the past 12 months, did you worry that your food would run out before you got money to buy more?: No       Within the past 12 months, did the food you bought just not last and you didn t have money to get more?: No   Transportation Needs: Low Risk  (1/2/2024)    Transportation Needs      Within the past 12 months, has lack of transportation kept you from medical appointments, getting your medicines, non-medical meetings or appointments, work, or from getting things that you need?: No   Physical Activity: Not on file   Stress: Not on file   Social Connections: Not on file   Interpersonal Safety: Not on file   Housing Stability: Low Risk  (1/2/2024)    Housing Stability      Do you have housing? : Yes      Are you worried about losing your housing?: No     No family history on file.      Subjective findings- 83-year-old returns clinic with his son for blister right fifth toenail.  Relates to doing the wound Vashe and a Band-Aid and is going well.    Objective findings- vascular status intact right.  Has a right fifth toenail and nailbed with no erythema, no drainage, no odor, no calor, no drainage, no pain on palpation.    Assessment and plan- Onycholysis with blister right fifth toenail this is healed well.  Diabetes with peripheral Neuropathy, Vascular disease and Kidney disease.  Diagnosis and treatment options discussed with them.  Okay to discontinue the wound Vashe and the Band-Aid as tolerated.  Return to clinic and see me in 2 to 3 months for Diabetic footcare.  Previous notes reviewed.              Low level of medical decision making.      Again, thank you for allowing me to participate in the care of your patient.        Sincerely,        Mynor Staley DPM

## 2024-02-21 ENCOUNTER — MYC REFILL (OUTPATIENT)
Dept: PODIATRY | Facility: CLINIC | Age: 84
End: 2024-02-21
Payer: MEDICARE

## 2024-02-21 DIAGNOSIS — B35.3 TINEA PEDIS OF BOTH FEET: ICD-10-CM

## 2024-02-21 DIAGNOSIS — B35.1 ONYCHOMYCOSIS OF MULTIPLE TOENAILS WITH TYPE 2 DIABETES MELLITUS (H): ICD-10-CM

## 2024-02-21 DIAGNOSIS — E11.69 ONYCHOMYCOSIS OF MULTIPLE TOENAILS WITH TYPE 2 DIABETES MELLITUS (H): ICD-10-CM

## 2024-02-21 RX ORDER — KETOCONAZOLE 20 MG/G
CREAM TOPICAL DAILY
Qty: 60 G | Refills: 5 | Status: SHIPPED | OUTPATIENT
Start: 2024-02-21

## 2024-02-21 NOTE — TELEPHONE ENCOUNTER
Prescription refill requested for:   ketoconazole (NIZORAL) 2 % external cream       Last Written Prescription Date:  3/1/23  Last Fill Quantity: 60 g,   # refills: 3  Last Office Visit: 2/16/24  Future Office visit:           Jane Key LPN

## 2024-03-19 ENCOUNTER — MYC REFILL (OUTPATIENT)
Dept: FAMILY MEDICINE | Facility: CLINIC | Age: 84
End: 2024-03-19
Payer: MEDICARE

## 2024-03-19 DIAGNOSIS — F41.9 ANXIETY: ICD-10-CM

## 2024-03-20 RX ORDER — SERTRALINE HYDROCHLORIDE 25 MG/1
TABLET, FILM COATED ORAL
Qty: 90 TABLET | Refills: 1 | Status: SHIPPED | OUTPATIENT
Start: 2024-03-20 | End: 2024-09-20

## 2024-03-23 ENCOUNTER — HEALTH MAINTENANCE LETTER (OUTPATIENT)
Age: 84
End: 2024-03-23

## 2024-04-15 ENCOUNTER — MYC REFILL (OUTPATIENT)
Dept: FAMILY MEDICINE | Facility: CLINIC | Age: 84
End: 2024-04-15
Payer: MEDICARE

## 2024-04-15 DIAGNOSIS — E53.8 VITAMIN B12 DEFICIENCY (NON ANEMIC): ICD-10-CM

## 2024-04-15 RX ORDER — CYANOCOBALAMIN 1000 UG/ML
1 INJECTION, SOLUTION INTRAMUSCULAR; SUBCUTANEOUS
Qty: 3 ML | Refills: 3 | Status: SHIPPED | OUTPATIENT
Start: 2024-04-15 | End: 2024-07-11

## 2024-04-15 RX ORDER — NEEDLES, SAFETY 18GX1 1/2"
NEEDLE, DISPOSABLE MISCELLANEOUS
Qty: 50 EACH | Refills: 0 | Status: SHIPPED | OUTPATIENT
Start: 2024-04-15 | End: 2024-07-11

## 2024-07-11 ENCOUNTER — MYC REFILL (OUTPATIENT)
Dept: FAMILY MEDICINE | Facility: CLINIC | Age: 84
End: 2024-07-11
Payer: MEDICARE

## 2024-07-11 DIAGNOSIS — E53.8 VITAMIN B12 DEFICIENCY (NON ANEMIC): ICD-10-CM

## 2024-07-11 RX ORDER — NEEDLES, SAFETY 18GX1 1/2"
NEEDLE, DISPOSABLE MISCELLANEOUS
Qty: 50 EACH | Refills: 0 | Status: SHIPPED | OUTPATIENT
Start: 2024-07-11

## 2024-07-11 RX ORDER — CYANOCOBALAMIN 1000 UG/ML
1 INJECTION, SOLUTION INTRAMUSCULAR; SUBCUTANEOUS
Qty: 3 ML | Refills: 0 | Status: SHIPPED | OUTPATIENT
Start: 2024-07-11

## 2024-07-17 ENCOUNTER — OFFICE VISIT (OUTPATIENT)
Dept: PODIATRY | Facility: CLINIC | Age: 84
End: 2024-07-17
Payer: MEDICARE

## 2024-07-17 DIAGNOSIS — E11.69 ONYCHOMYCOSIS OF MULTIPLE TOENAILS WITH TYPE 2 DIABETES MELLITUS (H): ICD-10-CM

## 2024-07-17 DIAGNOSIS — B35.1 ONYCHOMYCOSIS OF MULTIPLE TOENAILS WITH TYPE 2 DIABETES MELLITUS (H): ICD-10-CM

## 2024-07-17 DIAGNOSIS — N18.30 STAGE 3 CHRONIC KIDNEY DISEASE, UNSPECIFIED WHETHER STAGE 3A OR 3B CKD (H): ICD-10-CM

## 2024-07-17 DIAGNOSIS — E11.49 TYPE II OR UNSPECIFIED TYPE DIABETES MELLITUS WITH NEUROLOGICAL MANIFESTATIONS, NOT STATED AS UNCONTROLLED(250.60) (H): ICD-10-CM

## 2024-07-17 PROCEDURE — 99212 OFFICE O/P EST SF 10 MIN: CPT | Performed by: PODIATRIST

## 2024-07-17 NOTE — LETTER
7/17/2024      Tunde Gutierrez  [address]  [place] [address]      Dear Colleague,    Thank you for referring your patient, Tunde Gutierrez, to the [facility]. Please see a copy of my visit note below.    Past Medical History:   Diagnosis Date     Diabetes mellitus, type 2 (H)      Hypertension      Osteoarthritis of multiple joints      Patient Active Problem List   Diagnosis     Chronic constipation     Diabetes mellitus, type II (H)     Diabetic retinopathy without macular edema associated with diabetes mellitus due to underlying condition (H)     Hypertension     Morbid obesity (H)     Hyperlipidemia     Pain of right hand     Numbness and tingling in both hands     TIA (transient ischemic attack)     Past Surgical History:   Procedure Laterality Date     CATARACT IOL, RT/LT       JOINT REPLACEMENT       ORTHOPEDIC SURGERY       Social History     Socioeconomic History     Marital status:      Spouse name: Not on file     Number of children: Not on file     Years of education: Not on file     Highest education level: Not on file   Occupational History     Not on file   Tobacco Use     Smoking status: Never     Passive exposure: Past     Smokeless tobacco: Never   Vaping Use     Vaping status: Never Used   Substance and Sexual Activity     Alcohol use: Not Currently     Drug use: Not on file     Sexual activity: Not Currently   Other Topics Concern     Not on file   Social History Narrative     Not on file     Social Determinants of Health     Financial Resource Strain: Low Risk  (1/2/2024)    Financial Resource Strain      Within the past 12 months, have you or your family members you live with been unable to get utilities (heat, electricity) when it was really needed?: No   Food Insecurity: Low Risk  (1/2/2024)    Food Insecurity      Within the past 12 months, did you worry that your food would run out before you got money to buy more?: No      Within the past  12 months, did the food you bought just not last and you didn t have money to get more?: No   Transportation Needs: Low Risk  (1/2/2024)    Transportation Needs      Within the past 12 months, has lack of transportation kept you from medical appointments, getting your medicines, non-medical meetings or appointments, work, or from getting things that you need?: No   Physical Activity: Not on file   Stress: Not on file   Social Connections: Unknown (5/6/2023)    Received from [facility]    Social Connections      Frequency of Communication with Friends and Family: Not on file   Interpersonal Safety: Not At Risk (7/7/2024)    Received from [facility]     Humiliation, Afraid, Rape, and Kick questionnaire      Fear of Current or Ex-Partner: No      Emotionally Abused: No      Physically Abused: No      Sexually Abused: No   Housing Stability: Low Risk  (1/2/2024)    Housing Stability      Do you have housing? : Yes      Are you worried about losing your housing?: No     No family history on file.                    Subjective findings- 84-year-old returns clinic in his wheelchair with his wife for Diabetic foot cares.  Relates they missed their last appointment, relates to numbness tingling and neuropathy and in his hands, relates to no ulcers since we seen him last, relates he has Diabetic shoes and insoles but is wearing sketchers slip on's because is easier to get them on, relates to using the ketoconazole cream.     Objective findings- DP and PT are 1 out of 4 bilaterally, has decreased hair growth bilaterally, CFT is less than 3 seconds bilaterally.  Has minaimal dry scaly skin moccasin pattern bilaterally.  Has thickened dystrophic brittle nails with subungual debris dystrophy discoloration to differing degrees bilaterally.  There is no erythema, no drainage, no odor, no calor, no tendon voids bilaterally.  He has peripheral  edema bilaterally.  Mild distally contracted digits bilaterally.     Assessment and plan- Onychomycosis and Onychauxis bilaterally, mild Tinea Pedis bilaterally.  Diabetes with peripheral Neuropathy, Diabetes with peripheral Vascular disease and Kidney disease.  Diagnosis and treatment options discussed with them.  All the toenails were debrided or reduced bilaterally upon consent.  Continue the Diabetic shoes.  Continue the Ketoconazole cream and use discussed with them.  Previous notes reviewed.  Return to clinic and see me in 2-3 months.                                         Moderate level of medical decision making.      Again, thank you for allowing me to participate in the care of your patient.        Sincerely,        Mynor Staley DPM

## 2024-07-17 NOTE — PROGRESS NOTES
Past Medical History:   Diagnosis Date    Diabetes mellitus, type 2 (H)     Hypertension     Osteoarthritis of multiple joints      Patient Active Problem List   Diagnosis    Chronic constipation    Diabetes mellitus, type II (H)    Diabetic retinopathy without macular edema associated with diabetes mellitus due to underlying condition (H)    Hypertension    Morbid obesity (H)    Hyperlipidemia    Pain of right hand    Numbness and tingling in both hands    TIA (transient ischemic attack)     Past Surgical History:   Procedure Laterality Date    CATARACT IOL, RT/LT      JOINT REPLACEMENT      ORTHOPEDIC SURGERY       Social History     Socioeconomic History    Marital status:      Spouse name: Not on file    Number of children: Not on file    Years of education: Not on file    Highest education level: Not on file   Occupational History    Not on file   Tobacco Use    Smoking status: Never     Passive exposure: Past    Smokeless tobacco: Never   Vaping Use    Vaping status: Never Used   Substance and Sexual Activity    Alcohol use: Not Currently    Drug use: Not on file    Sexual activity: Not Currently   Other Topics Concern    Not on file   Social History Narrative    Not on file     Social Determinants of Health     Financial Resource Strain: Low Risk  (1/2/2024)    Financial Resource Strain     Within the past 12 months, have you or your family members you live with been unable to get utilities (heat, electricity) when it was really needed?: No   Food Insecurity: Low Risk  (1/2/2024)    Food Insecurity     Within the past 12 months, did you worry that your food would run out before you got money to buy more?: No     Within the past 12 months, did the food you bought just not last and you didn t have money to get more?: No   Transportation Needs: Low Risk  (1/2/2024)    Transportation Needs     Within the past 12 months, has lack of transportation kept you from medical appointments, getting your medicines,  non-medical meetings or appointments, work, or from getting things that you need?: No   Physical Activity: Not on file   Stress: Not on file   Social Connections: Unknown (5/6/2023)    Received from [facility], [facility]    Social Connections     Frequency of Communication with Friends and Family: Not on file   Interpersonal Safety: Not At Risk (7/7/2024)    Received from [facility]     Humiliation, Afraid, Rape, and Kick questionnaire     Fear of Current or Ex-Partner: No     Emotionally Abused: No     Physically Abused: No     Sexually Abused: No   Housing Stability: Low Risk  (1/2/2024)    Housing Stability     Do you have housing? : Yes     Are you worried about losing your housing?: No     No family history on file.                    Subjective findings- 84-year-old returns clinic in his wheelchair with his wife for Diabetic foot cares.  Relates they missed their last appointment, relates to numbness tingling and neuropathy and in his hands, relates to no ulcers since we seen him last, relates he has Diabetic shoes and insoles but is wearing sketchers slip on's because is easier to get them on, relates to using the ketoconazole cream.     Objective findings- DP and PT are 1 out of 4 bilaterally, has decreased hair growth bilaterally, CFT is less than 3 seconds bilaterally.  Has minaimal dry scaly skin moccasin pattern bilaterally.  Has thickened dystrophic brittle nails with subungual debris dystrophy discoloration to differing degrees bilaterally.  There is no erythema, no drainage, no odor, no calor, no tendon voids bilaterally.  He has peripheral edema bilaterally.  Mild distally contracted digits bilaterally.     Assessment and plan- Onychomycosis and Onychauxis bilaterally, mild Tinea Pedis bilaterally.  Diabetes with peripheral Neuropathy, Diabetes with peripheral Vascular disease and Kidney disease.  Diagnosis and treatment  options discussed with them.  All the toenails were debrided or reduced bilaterally upon consent.  Continue the Diabetic shoes.  Continue the Ketoconazole cream and use discussed with them.  Previous notes reviewed.  Return to clinic and see me in 2-3 months.                                         Moderate level of medical decision making.

## 2024-07-17 NOTE — NURSING NOTE
Tunde Gutierrez's chief complaint for this visit includes:  Chief Complaint   Patient presents with    Consult     Bilateral foot cares     PCP: Sylvia Myers    Referring Provider:  Referred Self, MD  No address on file    There were no vitals taken for this visit.  Data Unavailable     Do you need any medication refills at today's visit? NO    Allergies   Allergen Reactions    Ace Inhibitors Cough       Jane Key LPN

## 2024-08-10 ENCOUNTER — HEALTH MAINTENANCE LETTER (OUTPATIENT)
Age: 84
End: 2024-08-10

## 2024-09-13 ENCOUNTER — PATIENT OUTREACH (OUTPATIENT)
Dept: CARE COORDINATION | Facility: CLINIC | Age: 84
End: 2024-09-13
Payer: MEDICARE

## 2024-09-19 DIAGNOSIS — F41.9 ANXIETY: ICD-10-CM

## 2024-09-20 DIAGNOSIS — E08.319 DIABETIC RETINOPATHY WITHOUT MACULAR EDEMA ASSOCIATED WITH DIABETES MELLITUS DUE TO UNDERLYING CONDITION, UNSPECIFIED LATERALITY, UNSPECIFIED RETINOPATHY SEVERITY (H): ICD-10-CM

## 2024-09-20 RX ORDER — SERTRALINE HYDROCHLORIDE 25 MG/1
TABLET, FILM COATED ORAL
Qty: 90 TABLET | Refills: 0 | Status: SHIPPED | OUTPATIENT
Start: 2024-09-20 | End: 2024-10-04

## 2024-09-20 RX ORDER — SITAGLIPTIN AND METFORMIN HYDROCHLORIDE 1000; 50 MG/1; MG/1
1 TABLET, FILM COATED ORAL DAILY
Qty: 90 TABLET | Refills: 0 | Status: SHIPPED | OUTPATIENT
Start: 2024-09-20 | End: 2024-10-04

## 2024-09-27 ENCOUNTER — PATIENT OUTREACH (OUTPATIENT)
Dept: CARE COORDINATION | Facility: CLINIC | Age: 84
End: 2024-09-27
Payer: MEDICARE

## 2024-10-02 DIAGNOSIS — I10 BENIGN ESSENTIAL HYPERTENSION: ICD-10-CM

## 2024-10-02 RX ORDER — SPIRONOLACTONE 25 MG/1
25 TABLET ORAL DAILY
Qty: 90 TABLET | Refills: 3 | Status: SHIPPED | OUTPATIENT
Start: 2024-10-02

## 2024-10-04 ENCOUNTER — TELEPHONE (OUTPATIENT)
Dept: FAMILY MEDICINE | Facility: CLINIC | Age: 84
End: 2024-10-04

## 2024-10-04 ENCOUNTER — OFFICE VISIT (OUTPATIENT)
Dept: FAMILY MEDICINE | Facility: CLINIC | Age: 84
End: 2024-10-04
Payer: MEDICARE

## 2024-10-04 VITALS
RESPIRATION RATE: 14 BRPM | DIASTOLIC BLOOD PRESSURE: 58 MMHG | SYSTOLIC BLOOD PRESSURE: 99 MMHG | HEART RATE: 69 BPM | TEMPERATURE: 97.7 F | OXYGEN SATURATION: 95 %

## 2024-10-04 DIAGNOSIS — S49.91XD INJURY OF RIGHT SHOULDER, SUBSEQUENT ENCOUNTER: ICD-10-CM

## 2024-10-04 DIAGNOSIS — E11.22 CKD STAGE 3 DUE TO TYPE 2 DIABETES MELLITUS (H): ICD-10-CM

## 2024-10-04 DIAGNOSIS — I10 BENIGN ESSENTIAL HYPERTENSION: ICD-10-CM

## 2024-10-04 DIAGNOSIS — E66.01 MORBID OBESITY (H): ICD-10-CM

## 2024-10-04 DIAGNOSIS — G45.9 TIA (TRANSIENT ISCHEMIC ATTACK): ICD-10-CM

## 2024-10-04 DIAGNOSIS — N40.0 BENIGN PROSTATIC HYPERPLASIA, UNSPECIFIED WHETHER LOWER URINARY TRACT SYMPTOMS PRESENT: ICD-10-CM

## 2024-10-04 DIAGNOSIS — F41.9 ANXIETY: ICD-10-CM

## 2024-10-04 DIAGNOSIS — E08.319 DIABETIC RETINOPATHY WITHOUT MACULAR EDEMA ASSOCIATED WITH DIABETES MELLITUS DUE TO UNDERLYING CONDITION, UNSPECIFIED LATERALITY, UNSPECIFIED RETINOPATHY SEVERITY (H): ICD-10-CM

## 2024-10-04 DIAGNOSIS — E53.8 VITAMIN B12 DEFICIENCY (NON ANEMIC): ICD-10-CM

## 2024-10-04 DIAGNOSIS — M15.9 GENERALIZED OSTEOARTHRITIS: ICD-10-CM

## 2024-10-04 DIAGNOSIS — E11.319 TYPE 2 DIABETES MELLITUS WITH RETINOPATHY OF BOTH EYES, WITHOUT LONG-TERM CURRENT USE OF INSULIN, MACULAR EDEMA PRESENCE UNSPECIFIED, UNSPECIFIED RETINOPATHY SEVERITY (H): Primary | ICD-10-CM

## 2024-10-04 DIAGNOSIS — N18.30 CKD STAGE 3 DUE TO TYPE 2 DIABETES MELLITUS (H): ICD-10-CM

## 2024-10-04 DIAGNOSIS — H61.21 IMPACTED CERUMEN OF RIGHT EAR: ICD-10-CM

## 2024-10-04 DIAGNOSIS — E78.2 MIXED HYPERLIPIDEMIA: ICD-10-CM

## 2024-10-04 LAB
ERYTHROCYTE [DISTWIDTH] IN BLOOD BY AUTOMATED COUNT: 12.8 % (ref 10–15)
EST. AVERAGE GLUCOSE BLD GHB EST-MCNC: 180 MG/DL
HBA1C MFR BLD: 7.9 % (ref 0–5.6)
HCT VFR BLD AUTO: 40.9 % (ref 40–53)
HGB BLD-MCNC: 13.3 G/DL (ref 13.3–17.7)
MCH RBC QN AUTO: 32.3 PG (ref 26.5–33)
MCHC RBC AUTO-ENTMCNC: 32.5 G/DL (ref 31.5–36.5)
MCV RBC AUTO: 99 FL (ref 78–100)
PLATELET # BLD AUTO: 179 10E3/UL (ref 150–450)
RBC # BLD AUTO: 4.12 10E6/UL (ref 4.4–5.9)
WBC # BLD AUTO: 9 10E3/UL (ref 4–11)

## 2024-10-04 PROCEDURE — 84443 ASSAY THYROID STIM HORMONE: CPT | Performed by: PREVENTIVE MEDICINE

## 2024-10-04 PROCEDURE — 80061 LIPID PANEL: CPT | Performed by: PREVENTIVE MEDICINE

## 2024-10-04 PROCEDURE — 99214 OFFICE O/P EST MOD 30 MIN: CPT | Mod: 25 | Performed by: PREVENTIVE MEDICINE

## 2024-10-04 PROCEDURE — 69209 REMOVE IMPACTED EAR WAX UNI: CPT | Mod: RT | Performed by: PREVENTIVE MEDICINE

## 2024-10-04 PROCEDURE — 82043 UR ALBUMIN QUANTITATIVE: CPT | Performed by: PREVENTIVE MEDICINE

## 2024-10-04 PROCEDURE — 82607 VITAMIN B-12: CPT | Performed by: PREVENTIVE MEDICINE

## 2024-10-04 PROCEDURE — 85027 COMPLETE CBC AUTOMATED: CPT | Performed by: PREVENTIVE MEDICINE

## 2024-10-04 PROCEDURE — 83036 HEMOGLOBIN GLYCOSYLATED A1C: CPT | Performed by: PREVENTIVE MEDICINE

## 2024-10-04 PROCEDURE — 36415 COLL VENOUS BLD VENIPUNCTURE: CPT | Performed by: PREVENTIVE MEDICINE

## 2024-10-04 PROCEDURE — 80053 COMPREHEN METABOLIC PANEL: CPT | Performed by: PREVENTIVE MEDICINE

## 2024-10-04 PROCEDURE — 82570 ASSAY OF URINE CREATININE: CPT | Performed by: PREVENTIVE MEDICINE

## 2024-10-04 RX ORDER — ROSUVASTATIN CALCIUM 20 MG/1
20 TABLET, COATED ORAL DAILY
Qty: 90 TABLET | Refills: 3 | Status: SHIPPED | OUTPATIENT
Start: 2024-10-04

## 2024-10-04 RX ORDER — SERTRALINE HYDROCHLORIDE 25 MG/1
TABLET, FILM COATED ORAL
Qty: 90 TABLET | Refills: 3 | Status: SHIPPED | OUTPATIENT
Start: 2024-10-04

## 2024-10-04 RX ORDER — ALFUZOSIN HYDROCHLORIDE 10 MG/1
1 TABLET, EXTENDED RELEASE ORAL DAILY
Qty: 90 TABLET | Refills: 3 | Status: SHIPPED | OUTPATIENT
Start: 2024-10-04

## 2024-10-04 RX ORDER — LOSARTAN POTASSIUM 25 MG/1
25 TABLET ORAL DAILY
Qty: 90 TABLET | Refills: 3 | Status: SHIPPED | OUTPATIENT
Start: 2024-10-04

## 2024-10-04 RX ORDER — SITAGLIPTIN AND METFORMIN HYDROCHLORIDE 1000; 50 MG/1; MG/1
1 TABLET, FILM COATED ORAL DAILY
Qty: 90 TABLET | Refills: 3 | Status: SHIPPED | OUTPATIENT
Start: 2024-10-04

## 2024-10-04 NOTE — TELEPHONE ENCOUNTER
General Call    Contacts       Contact Date/Time Type Contact Phone/Fax    10/04/2024 05:16 PM CDT Phone (Incoming) [facility] (Other) [phone]          Reason for Call:  [facility]    What are your questions or concerns:  pt accepted for home care. There is a delayed start of care until 10/8. Need PCP to okay this and fax. PCP can also do this in EPIC  and note it there and it needs a signature.     PCP can even note it before he signs the face to face and include it in there. Just so it's documented and confirmed in there. Otherwise they'll have to find a different agency to receive care until the 8th     Date of last appointment with provider: 10/4/2024    Could we send this information to you in [place] or would you prefer to receive a phone call?:   Patient would prefer a phone call   Okay to leave a detailed message?: Yes at Other phone number:  [phone]

## 2024-10-04 NOTE — PROGRESS NOTES
Patient identified using two patient identifiers.  Ear exam showing wax occlusion completed by provider.  H202/H20 was placed in the right ear(s) via irrigation tool: elephant ear.     Daisy Evans MA

## 2024-10-04 NOTE — RESULT ENCOUNTER NOTE
Tunde,    Complete blood count is not showing anemia or infection.     Please do not hesitate to call us at [phone] if you have any questions or concerns.    Thank you,    Sylvia Myers MD MPH

## 2024-10-04 NOTE — RESULT ENCOUNTER NOTE
Tunde,     3-month glucose number hemoglobin A1c has increased some from last check from 7.6 to 7.9.    This is still at goal for you.    Other labs are pending.     Please do not hesitate to call us at [phone] if you have any questions or concerns.    Thank you,    Sylvia Myers MD MPH

## 2024-10-04 NOTE — PROGRESS NOTES
Assessment & Plan     Type 2 diabetes mellitus with retinopathy of both eyes, without long-term current use of insulin, macular edema presence unspecified, unspecified retinopathy severity (H)  -await labs   - HEMOGLOBIN A1C  - Lipid panel reflex to direct LDL Non-fasting  - Albumin Random Urine Quantitative with Creat Ratio  - Comprehensive metabolic panel (BMP + Alb, Alk Phos, ALT, AST, Total. Bili, TP)  - TSH with free T4 reflex    Lab Results   Component Value Date    A1C 7.6 10/13/2023    A1C 7.2 04/25/2023    A1C 6.9 09/30/2022     Continue medications the same.    Periodic blood sugar testing.  Regular foot checks  Limit carbohydrates and sweets in diet  Update eye exam annually   Regular exercise, 150 minutes per week  Hypoglycemia precautions       Mixed hyperlipidemia  -continue statin   - Lipid panel reflex to direct LDL Non-fasting  - Comprehensive metabolic panel (BMP + Alb, Alk Phos, ALT, AST, Total. Bili, TP)  - rosuvastatin (CRESTOR) 20 MG tablet  Dispense: 90 tablet; Refill: 3    Vitamin B12 deficiency (non anemic)  -on B 12 injections   - CBC with platelets  - Comprehensive metabolic panel (BMP + Alb, Alk Phos, ALT, AST, Total. Bili, TP)  - Vitamin B12    Benign essential hypertension  -noted to have low blood pressure  -Reduce dose of losartan from 50 mg to 25 mg  - Comprehensive metabolic panel (BMP + Alb, Alk Phos, ALT, AST, Total. 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)  -Discussed avoiding use of NSAIDs    Morbid obesity (H)    Wt Readings from Last 2 Encounters:   07/21/23 105.8 kg (233 lb 3.2 oz)   07/11/23 102.3 kg (225 lb 9.6 oz)        Generalized osteoarthritis  - Ankle/Knee Bracing Supplies Order Knee Sleeve/Brace; Right; Open    Impacted cerumen of right ear  - does use hearing aids  - MI REMOVAL IMPACTED CERUMEN IRRIGATION/LVG UNILAT  (RN/MA)    Anxiety  -Mood symptoms are stable   - sertraline (ZOLOFT) 25 MG tablet  Dispense: 90 tablet; Refill: 3    Diabetic retinopathy without macular edema associated with diabetes mellitus due to underlying condition, unspecified laterality, unspecified retinopathy severity (H)  - JANUMET  MG tablet  Dispense: 90 tablet; Refill: 3    Benign prostatic hyperplasia, unspecified whether lower urinary tract symptoms present  - alfuzosin ER (UROXATRAL) 10 MG 24 hr tablet  Dispense: 90 tablet; Refill: 3    Injury of right shoulder, subsequent encounter  -  History of fall in July, patient was evaluated in the emergency room, has had persistent shoulder pain since then  - Home Care Referral          Subjective   Tunde is a 84 year old, presenting for the following health issues:  Hypertension, Lipids, and Diabetes        10/4/2024     2:38 PM   Additional Questions   Roomed by humza   Accompanied by spouse and grandson         10/4/2024     2:38 PM   Patient Reported Additional Medications   Patient reports taking the following new medications no       [facility]     History of Present Illness       Reason for visit:  General check   He is taking medications regularly.     Wt Readings from Last 2 Encounters:   07/21/23 105.8 kg (233 lb 3.2 oz)   07/11/23 102.3 kg (225 lb 9.6 oz)        Right shoulder injury after a fall 7/24, reviewed evaluation from the emergency room.  Has had pain and stiffness since then.  Is right hand dominant and impacting ability to feed himself etc.  Will need PT but through Home Health    Diabetes Follow-up    How often are you checking your blood sugar? Not at all  What concerns do you have today about your diabetes? None   Do you have any of these symptoms? (Select all that apply)  No numbness or tingling in feet.  No redness, sores or blisters on feet.  No complaints of excessive thirst.  No reports of blurry vision.  No significant changes to  weight.  Have you had a diabetic eye exam in the last 12 months? No            Hyperlipidemia Follow-Up    Are you regularly taking any medication or supplement to lower your cholesterol?   Yes- atorvastatin   Are you having muscle aches or other side effects that you think could be caused by your cholesterol lowering medication?  No    Hypertension Follow-up    Do you check your blood pressure regularly outside of the clinic? No   Are you following a low salt diet? No, does not add salt to food   Are your blood pressures ever more than 140 on the top number (systolic) OR more   than 90 on the bottom number (diastolic), for example 140/90? Does not check blood pressure outside of clinic     BP Readings from Last 2 Encounters:   10/04/24 99/58   10/13/23 115/62     Hemoglobin A1C (%)   Date Value   10/13/2023 7.6 (H)   04/25/2023 7.2 (H)     LDL Cholesterol Calculated (mg/dL)   Date Value   10/13/2023 49   02/13/2023 37     How many servings of fruits and vegetables do you eat daily?  0-1  On average, how many sweetened beverages do you drink each day (Examples: soda, juice, sweet tea, etc.  Do NOT count diet or artificially sweetened beverages)?   0  How many days per week do you exercise enough to make your heart beat faster? 7- work on legs and arms   How many minutes a day do you exercise enough to make your heart beat faster? 60 or more- 75 minutes per day   How many days per week do you miss taking your medication? 0      Has a tremor for the right hand+  2 artifical knees  Once in a while one knee will give out, brace+ wondering if that would help.  Would like to try slip on knee sleeve      Review of Systems  Constitutional, HEENT, cardiovascular, pulmonary, gi and gu systems are negative, except as otherwise noted.      Objective    BP 99/58   Pulse 69   Temp 97.7  F (36.5  C) (Oral)   Resp 14   SpO2 95%   There is no height or weight on file to calculate BMI.  Physical Exam   GENERAL APPEARANCE:  healthy, alert and no distress, seated in a wheelchair   EYES: Eyes grossly normal to inspection and conjunctivae and sclerae normal  EARS: impacted cerumen right side. Ear wash done by MA   RESP: lungs clear to auscultation - no rales, rhonchi or wheezes  CV: regular rates and rhythm, normal S1 S2  ABDOMEN: soft, non-tender and no rebound or guarding   MS: extremities normal- no gross deformities noted  NEURO: Normal strength and tone, mentation intact and speech normal  PSYCH: mentation appears normal  Right shoulder: decreased range of motion++.   Left knee: slight tenderness on anterior aspect, no edema, no erythema, range of motion intact, no popliteal tenderness       No results found for this or any previous visit (from the past 24 hour(s)).        Signed Electronically by: Sylvia Myers MD MPH      DME (Durable Medical Equipment) Orders and Documentation  Orders Placed This Encounter   Procedures    Ankle/Knee Bracing Supplies Order Knee Sleeve/Brace; Right; Open        The patient was assessed and it was determined the patient is in need of the following listed DME Supplies/Equipment. Please complete supporting documentation below to demonstrate medical necessity.

## 2024-10-05 LAB
ALBUMIN SERPL BCG-MCNC: 4.3 G/DL (ref 3.5–5.2)
ALP SERPL-CCNC: 88 U/L (ref 40–150)
ALT SERPL W P-5'-P-CCNC: 24 U/L (ref 0–70)
ANION GAP SERPL CALCULATED.3IONS-SCNC: 13 MMOL/L (ref 7–15)
AST SERPL W P-5'-P-CCNC: 31 U/L (ref 0–45)
BILIRUB SERPL-MCNC: 0.4 MG/DL
BUN SERPL-MCNC: 28 MG/DL (ref 8–23)
CALCIUM SERPL-MCNC: 8.8 MG/DL (ref 8.8–10.4)
CHLORIDE SERPL-SCNC: 107 MMOL/L (ref 98–107)
CHOLEST SERPL-MCNC: 151 MG/DL
CREAT SERPL-MCNC: 1.86 MG/DL (ref 0.67–1.17)
CREAT UR-MCNC: 244 MG/DL
EGFRCR SERPLBLD CKD-EPI 2021: 35 ML/MIN/1.73M2
FASTING STATUS PATIENT QL REPORTED: NO
FASTING STATUS PATIENT QL REPORTED: NO
GLUCOSE SERPL-MCNC: 142 MG/DL (ref 70–99)
HCO3 SERPL-SCNC: 21 MMOL/L (ref 22–29)
HDLC SERPL-MCNC: 34 MG/DL
LDLC SERPL CALC-MCNC: 78 MG/DL
MICROALBUMIN UR-MCNC: 43.3 MG/L
MICROALBUMIN/CREAT UR: 17.75 MG/G CR (ref 0–17)
NONHDLC SERPL-MCNC: 117 MG/DL
POTASSIUM SERPL-SCNC: 4.6 MMOL/L (ref 3.4–5.3)
PROT SERPL-MCNC: 7 G/DL (ref 6.4–8.3)
SODIUM SERPL-SCNC: 141 MMOL/L (ref 135–145)
TRIGL SERPL-MCNC: 194 MG/DL
TSH SERPL DL<=0.005 MIU/L-ACNC: 2.61 UIU/ML (ref 0.3–4.2)
VIT B12 SERPL-MCNC: 1040 PG/ML (ref 232–1245)

## 2024-10-07 NOTE — TELEPHONE ENCOUNTER
Routing to provider to advise on home care order delay in start of care request      Betsey Parks, RN, BSN  [facility]

## 2024-10-08 NOTE — TELEPHONE ENCOUNTER
Called back and left message for verbal orders/approval to delay care.  Ilda Cardona RN    [facility]- Primary Care

## 2024-10-09 ENCOUNTER — OFFICE VISIT (OUTPATIENT)
Dept: PODIATRY | Facility: CLINIC | Age: 84
End: 2024-10-09
Payer: MEDICARE

## 2024-10-09 DIAGNOSIS — E11.69 ONYCHOMYCOSIS OF MULTIPLE TOENAILS WITH TYPE 2 DIABETES MELLITUS (H): ICD-10-CM

## 2024-10-09 DIAGNOSIS — B35.1 ONYCHOMYCOSIS OF MULTIPLE TOENAILS WITH TYPE 2 DIABETES MELLITUS (H): ICD-10-CM

## 2024-10-09 DIAGNOSIS — E11.49 TYPE II OR UNSPECIFIED TYPE DIABETES MELLITUS WITH NEUROLOGICAL MANIFESTATIONS, NOT STATED AS UNCONTROLLED(250.60) (H): Primary | ICD-10-CM

## 2024-10-09 DIAGNOSIS — N18.30 STAGE 3 CHRONIC KIDNEY DISEASE, UNSPECIFIED WHETHER STAGE 3A OR 3B CKD (H): ICD-10-CM

## 2024-10-09 PROCEDURE — 11720 DEBRIDE NAIL 1-5: CPT | Performed by: PODIATRIST

## 2024-10-09 PROCEDURE — 99207 PR DROP WITH A PROCEDURE: CPT | Performed by: PODIATRIST

## 2024-10-09 NOTE — NURSING NOTE
Tunde Gutierrez's chief complaint for this visit includes:  Chief Complaint   Patient presents with    Consult     Toe nail recheck      PCP: Sylvia Myers    Referring Provider:  Referred Self, MD  No address on file    There were no vitals taken for this visit.  Data Unavailable     Do you need any medication refills at today's visit? NO    Allergies   Allergen Reactions    Ace Inhibitors Cough       Jane Key LPN

## 2024-10-09 NOTE — PROGRESS NOTES
Past Medical History:   Diagnosis Date    Diabetes mellitus, type 2 (H)     Hypertension     Osteoarthritis of multiple joints      Patient Active Problem List   Diagnosis    Chronic constipation    Diabetes mellitus, type II (H)    Diabetic retinopathy without macular edema associated with diabetes mellitus due to underlying condition (H)    Hypertension    Morbid obesity (H)    Hyperlipidemia    Pain of right hand    Numbness and tingling in both hands    TIA (transient ischemic attack)     Past Surgical History:   Procedure Laterality Date    CATARACT IOL, RT/LT      JOINT REPLACEMENT      ORTHOPEDIC SURGERY       Social History     Socioeconomic History    Marital status:      Spouse name: Not on file    Number of children: Not on file    Years of education: Not on file    Highest education level: Not on file   Occupational History    Not on file   Tobacco Use    Smoking status: Never     Passive exposure: Past    Smokeless tobacco: Never   Vaping Use    Vaping status: Never Used   Substance and Sexual Activity    Alcohol use: Not Currently    Drug use: Not on file    Sexual activity: Not Currently   Other Topics Concern    Not on file   Social History Narrative    Not on file     Social Determinants of Health     Financial Resource Strain: Low Risk  (1/2/2024)    Financial Resource Strain     Within the past 12 months, have you or your family members you live with been unable to get utilities (heat, electricity) when it was really needed?: No   Food Insecurity: Low Risk  (1/2/2024)    Food Insecurity     Within the past 12 months, did you worry that your food would run out before you got money to buy more?: No     Within the past 12 months, did the food you bought just not last and you didn t have money to get more?: No   Transportation Needs: Low Risk  (1/2/2024)    Transportation Needs     Within the past 12 months, has lack of transportation kept you from medical appointments, getting your medicines,  non-medical meetings or appointments, work, or from getting things that you need?: No   Physical Activity: Not on file   Stress: Not on file   Social Connections: Unknown (5/6/2023)    Received from [facility], [facility]    Social Connections     Frequency of Communication with Friends and Family: Not on file   Interpersonal Safety: Not At Risk (7/7/2024)    Received from [facility]     Humiliation, Afraid, Rape, and Kick questionnaire     Fear of Current or Ex-Partner: No     Emotionally Abused: No     Physically Abused: No     Sexually Abused: No   Housing Stability: Low Risk  (1/2/2024)    Housing Stability     Do you have housing? : Yes     Are you worried about losing your housing?: No     No family history on file.        Subjective findings- 84-year-old returns clinic in his wheelchair with his wife for Diabetic foot cares.  Relates to numbness tingling and neuropathy and in his hands, relates to no ulcers since we seen him last, relates he has Diabetic shoes and insoles but is wearing sketchers slip on's because is easier to get them on, relates to using the ketoconazole cream until they ran out, relates he needs his toenails cut.     Objective findings- DP and PT are 1 out of 4 bilaterally, has decreased hair growth bilaterally, CFT is less than 3 seconds bilaterally.  Has thickened dystrophic brittle nails with subungual debris dystrophy discoloration to differing degrees bilaterally.  There is no erythema, no drainage, no odor, no calor, no tendon voids bilaterally.  He has peripheral edema bilaterally.  Mild distally contracted digits bilaterally.     Assessment and plan- Onychomycosis and Onychauxis bilaterally.  Diabetes with peripheral Neuropathy, Diabetes with peripheral Vascular disease and Kidney disease.  Diagnosis and treatment options discussed with them.  All the toenails were debrided or reduced bilaterally upon  consent.  Continue the Diabetic shoes.  Continue the Ketoconazole cream and use discussed with them advised them that they do have refills for this.  Previous notes reviewed.  Return to clinic and see me in 2-3 months.                                                                   Moderate level of medical decision making.

## 2024-10-09 NOTE — LETTER
10/9/2024      Tunde Gutierrez  [address]  [place] [address]      Dear Colleague,    Thank you for referring your patient, Tunde Gutierrez, to the [facility]. Please see a copy of my visit note below.    Past Medical History:   Diagnosis Date     Diabetes mellitus, type 2 (H)      Hypertension      Osteoarthritis of multiple joints      Patient Active Problem List   Diagnosis     Chronic constipation     Diabetes mellitus, type II (H)     Diabetic retinopathy without macular edema associated with diabetes mellitus due to underlying condition (H)     Hypertension     Morbid obesity (H)     Hyperlipidemia     Pain of right hand     Numbness and tingling in both hands     TIA (transient ischemic attack)     Past Surgical History:   Procedure Laterality Date     CATARACT IOL, RT/LT       JOINT REPLACEMENT       ORTHOPEDIC SURGERY       Social History     Socioeconomic History     Marital status:      Spouse name: Not on file     Number of children: Not on file     Years of education: Not on file     Highest education level: Not on file   Occupational History     Not on file   Tobacco Use     Smoking status: Never     Passive exposure: Past     Smokeless tobacco: Never   Vaping Use     Vaping status: Never Used   Substance and Sexual Activity     Alcohol use: Not Currently     Drug use: Not on file     Sexual activity: Not Currently   Other Topics Concern     Not on file   Social History Narrative     Not on file     Social Determinants of Health     Financial Resource Strain: Low Risk  (1/2/2024)    Financial Resource Strain      Within the past 12 months, have you or your family members you live with been unable to get utilities (heat, electricity) when it was really needed?: No   Food Insecurity: Low Risk  (1/2/2024)    Food Insecurity      Within the past 12 months, did you worry that your food would run out before you got money to buy more?: No      Within the past  12 months, did the food you bought just not last and you didn t have money to get more?: No   Transportation Needs: Low Risk  (1/2/2024)    Transportation Needs      Within the past 12 months, has lack of transportation kept you from medical appointments, getting your medicines, non-medical meetings or appointments, work, or from getting things that you need?: No   Physical Activity: Not on file   Stress: Not on file   Social Connections: Unknown (5/6/2023)    Received from [facility], [facility]    Social Connections      Frequency of Communication with Friends and Family: Not on file   Interpersonal Safety: Not At Risk (7/7/2024)    Received from [facility]     Humiliation, Afraid, Rape, and Kick questionnaire      Fear of Current or Ex-Partner: No      Emotionally Abused: No      Physically Abused: No      Sexually Abused: No   Housing Stability: Low Risk  (1/2/2024)    Housing Stability      Do you have housing? : Yes      Are you worried about losing your housing?: No     No family history on file.        Subjective findings- 84-year-old returns clinic in his wheelchair with his wife for Diabetic foot cares.  Relates to numbness tingling and neuropathy and in his hands, relates to no ulcers since we seen him last, relates he has Diabetic shoes and insoles but is wearing sketchers slip on's because is easier to get them on, relates to using the ketoconazole cream until they ran out, relates he needs his toenails cut.     Objective findings- DP and PT are 1 out of 4 bilaterally, has decreased hair growth bilaterally, CFT is less than 3 seconds bilaterally.  Has thickened dystrophic brittle nails with subungual debris dystrophy discoloration to differing degrees bilaterally.  There is no erythema, no drainage, no odor, no calor, no tendon voids bilaterally.  He has peripheral edema bilaterally.  Mild distally contracted digits  bilaterally.     Assessment and plan- Onychomycosis and Onychauxis bilaterally.  Diabetes with peripheral Neuropathy, Diabetes with peripheral Vascular disease and Kidney disease.  Diagnosis and treatment options discussed with them.  All the toenails were debrided or reduced bilaterally upon consent.  Continue the Diabetic shoes.  Continue the Ketoconazole cream and use discussed with them advised them that they do have refills for this.  Previous notes reviewed.  Return to clinic and see me in 2-3 months.                                                                   Moderate level of medical decision making.      Again, thank you for allowing me to participate in the care of your patient.        Sincerely,        Mynor Staley DPM

## 2024-10-10 DIAGNOSIS — E11.319 TYPE 2 DIABETES MELLITUS WITH RETINOPATHY OF BOTH EYES, WITHOUT LONG-TERM CURRENT USE OF INSULIN, MACULAR EDEMA PRESENCE UNSPECIFIED, UNSPECIFIED RETINOPATHY SEVERITY (H): Primary | ICD-10-CM

## 2024-10-10 NOTE — RESULT ENCOUNTER NOTE
Tunde,     Urine sample is not showing any large amounts of abnormal protein.  Thyroid function is normal.  Vitamin B 12 levels are normal.  LDL cholesterol is at goal for you.  Electrolytes and liver function tests are normal.  Kidney function was a little lower than your baseline but it also looked like you were dehydrated. Stay well hydrated.   We can recheck labs in 1 month, orders are in the system, you can schedule a lab only visit. If kidney function is still low then dose of Januvia and Metformin needs adjustment.    Please do not hesitate to call us at [phone] if you have any questions or concerns.    Thank you,    Sylvia Myers MD MPH

## 2024-10-22 ENCOUNTER — ANCILLARY PROCEDURE (OUTPATIENT)
Dept: GENERAL RADIOLOGY | Facility: CLINIC | Age: 84
End: 2024-10-22
Attending: FAMILY MEDICINE
Payer: MEDICARE

## 2024-10-22 ENCOUNTER — TELEPHONE (OUTPATIENT)
Dept: FAMILY MEDICINE | Facility: CLINIC | Age: 84
End: 2024-10-22
Payer: MEDICARE

## 2024-10-22 ENCOUNTER — MEDICAL CORRESPONDENCE (OUTPATIENT)
Dept: HEALTH INFORMATION MANAGEMENT | Facility: CLINIC | Age: 84
End: 2024-10-22

## 2024-10-22 ENCOUNTER — NURSE TRIAGE (OUTPATIENT)
Dept: FAMILY MEDICINE | Facility: CLINIC | Age: 84
End: 2024-10-22

## 2024-10-22 ENCOUNTER — OFFICE VISIT (OUTPATIENT)
Dept: FAMILY MEDICINE | Facility: CLINIC | Age: 84
End: 2024-10-22
Payer: MEDICARE

## 2024-10-22 VITALS
BODY MASS INDEX: 32.93 KG/M2 | RESPIRATION RATE: 16 BRPM | SYSTOLIC BLOOD PRESSURE: 123 MMHG | TEMPERATURE: 98.2 F | HEIGHT: 70 IN | DIASTOLIC BLOOD PRESSURE: 65 MMHG | WEIGHT: 230 LBS | HEART RATE: 79 BPM | OXYGEN SATURATION: 96 %

## 2024-10-22 DIAGNOSIS — R35.0 URINARY FREQUENCY: ICD-10-CM
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
## 2024-10-22 LAB
ALBUMIN UR-MCNC: 30 MG/DL
APPEARANCE UR: CLEAR
BACTERIA #/AREA URNS HPF: ABNORMAL /HPF
BILIRUB UR QL STRIP: NEGATIVE
COLOR UR AUTO: YELLOW
ERYTHROCYTE [DISTWIDTH] IN BLOOD BY AUTOMATED COUNT: 12.9 % (ref 10–15)
GLUCOSE UR STRIP-MCNC: NEGATIVE MG/DL
HCT VFR BLD AUTO: 36.4 % (ref 40–53)
HGB BLD-MCNC: 11.9 G/DL (ref 13.3–17.7)
HGB UR QL STRIP: NEGATIVE
KETONES UR STRIP-MCNC: NEGATIVE MG/DL
LEUKOCYTE ESTERASE UR QL STRIP: NEGATIVE
MCH RBC QN AUTO: 32.9 PG (ref 26.5–33)
MCHC RBC AUTO-ENTMCNC: 32.7 G/DL (ref 31.5–36.5)
MCV RBC AUTO: 101 FL (ref 78–100)
MUCOUS THREADS #/AREA URNS LPF: PRESENT /LPF
NITRATE UR QL: NEGATIVE
PH UR STRIP: 5.5 [PH] (ref 5–7)
PLATELET # BLD AUTO: 227 10E3/UL (ref 150–450)
RBC # BLD AUTO: 3.62 10E6/UL (ref 4.4–5.9)
RBC #/AREA URNS AUTO: ABNORMAL /HPF
SP GR UR STRIP: 1.02 (ref 1–1.03)
UROBILINOGEN UR STRIP-ACNC: 0.2 E.U./DL
WBC # BLD AUTO: 11.3 10E3/UL (ref 4–11)
WBC #/AREA URNS AUTO: ABNORMAL /HPF

## 2024-10-22 PROCEDURE — 73130 X-RAY EXAM OF HAND: CPT | Mod: TC | Performed by: RADIOLOGY

## 2024-10-22 PROCEDURE — 73030 X-RAY EXAM OF SHOULDER: CPT | Mod: TC | Performed by: RADIOLOGY

## 2024-10-22 PROCEDURE — 73080 X-RAY EXAM OF ELBOW: CPT | Mod: TC | Performed by: RADIOLOGY

## 2024-10-22 PROCEDURE — 99214 OFFICE O/P EST MOD 30 MIN: CPT | Performed by: FAMILY MEDICINE

## 2024-10-22 PROCEDURE — 81001 URINALYSIS AUTO W/SCOPE: CPT | Performed by: FAMILY MEDICINE

## 2024-10-22 PROCEDURE — 73522 X-RAY EXAM HIPS BI 3-4 VIEWS: CPT | Mod: TC | Performed by: RADIOLOGY

## 2024-10-22 PROCEDURE — 85027 COMPLETE CBC AUTOMATED: CPT | Performed by: FAMILY MEDICINE

## 2024-10-22 PROCEDURE — 80048 BASIC METABOLIC PNL TOTAL CA: CPT | Performed by: FAMILY MEDICINE

## 2024-10-22 PROCEDURE — 36415 COLL VENOUS BLD VENIPUNCTURE: CPT | Performed by: FAMILY MEDICINE

## 2024-10-22 ASSESSMENT — PAIN SCALES - GENERAL: PAINLEVEL: EXTREME PAIN (8)

## 2024-10-22 NOTE — TELEPHONE ENCOUNTER
Home Care is calling regarding an established patient with [facility].        10/22/2024    12:54 PM   Home Care Information   Date of Home Care episode start 10/8/2024   Current following provider Dr. Myers   Date provider agreed to follow 10/4/2024    Name/Phone Number SAM[facility] Quiles - [phone]   Home Care agency [facility] [place]     Requesting orders from: Sylvia Myers  Provider is following patient: Yes  Is this a 60-day recertification request?  No    Orders Requested    Skilled Nursing  Request for initial evaluation and treatment (one time) if provider feels this is needed. PT states patient and family have not been checking BGs at home - asking if provider feels SN needs to get involved to go over medications and encourage family to check BGs or not    Physical Therapy  Request for initial certification (first set of orders)  Frequency: 1x/week for 2 weeks, then every other week 6 weeks    Occupational Therapy  Request for initial evaluation and treatment (one time)    HHA (Home Health Aide)  Request for initial certification (first set of orders)  Frequency: 1/week for 4 weeks      Information was gathered and will be sent to provider for review. Verbal for OT was given per protocol. RN will contact Home Care with information after provider review.    Confirmed ok to leave a detailed message with call back.  Contact information confirmed and updated as needed.      Betsey Parks, RN, BSN  [facility]

## 2024-10-22 NOTE — TELEPHONE ENCOUNTER
This writer attempted to contact [facility] on 10/22/24      Reason for call home care and left detailed message.      If patient calls back:   Relay message below, (read verbatim), document that pt called and close encounter        Adriana Velasquez RN

## 2024-10-22 NOTE — TELEPHONE ENCOUNTER
S-(situation): Patient's granddaughter, Radhika, calling in regarding patient having a fall today.  Patient needed to be helped up by the fire department.  Patient gave permission for writer to speak with Radhika.      B-(background): Patient working with PT/OT homecare and was just assessed today for PT.      A-(assessment): Patient having more weakness and falls.  He is also having some frequency, trouble emptying fully, and nocturia.  Granddaughter states he was at home with family and transferring from his wheelchair to his recliner when his assistant turned to grab the cushion from another chair and the patient fell.  He hit is right side and family states he might have hit his head a little bit but mostly his hip and arm.  Patient is on an Aspirin 81 mg.  He is complaining of pain in his right arm.      R-(recommendations): Family wanted to bring him to clinic today.  Discussed if no appointments would have to go to Urgent Care.  Appointment was found for patient later today and family opted to go with that.  Discussed if patient should worsen, he should be brought in immediately.  Granddaughter verbalized understanding.    Kristina Kjellberg, MSN, RN        Reason for Disposition   MODERATE weakness (i.e., interferes with work, school, normal activities) and new-onset or worsening    Additional Information   Negative: Major injury from dangerous force (e.g., fall > 10 feet or 3 meters)   Negative: Major bleeding (e.g., actively dripping or spurting) and can't be stopped   Negative: Shock suspected (e.g., cold/pale/clammy skin, too weak to stand)   Negative: Difficult to awaken or acting confused (e.g., disoriented, slurred speech)   Negative: SEVERE weakness (i.e., unable to walk or barely able to walk, requires support) and new-onset or worsening   Negative: Can't stand (bear weight) or walk and new-onset after fall   Negative: Sounds like a life-threatening emergency to the triager   Negative: Injury (or  "injuries) that need emergency care   Negative: Sounds like a serious injury to the triager   Negative: Muscle pain and dark (cola colored) or red-colored urine   Negative: Unable to get up until help (e.g., caregiver, family, friend) arrived and on the ground 1 hour or more   Negative: Patient sounds very sick or weak to the triager    Answer Assessment - Initial Assessment Questions  1. MECHANISM: \"How did the fall happen?\"      With wife and daughter, transferring from  to recliner, standing waiting for cushion and he fell down.    2. DOMESTIC VIOLENCE AND ELDER ABUSE SCREENING: \"Did you fall because someone pushed you or tried to hurt you?\" If Yes, ask: \"Are you safe now?\"      Denies any issues  3. ONSET: \"When did the fall happen?\" (e.g., minutes, hours, or days ago)      Today about 12:45   4. LOCATION: \"What part of the body hit the ground?\" (e.g., back, buttocks, head, hips, knees, hands, head, stomach)      R arm, hip, head but hit carpet  5. INJURY: \"Did you hurt (injure) yourself when you fell?\" If Yes, ask: \"What did you injure? Tell me more about this?\" (e.g., body area; type of injury; pain severity)\"      Arm is sore, good ROM per granddaughter but is more sore  6. PAIN: \"Is there any pain?\" If Yes, ask: \"How bad is the pain?\" (e.g., Scale 1-10; or mild,   moderate, severe)    - NONE (0): No pain    - MILD (1-3): Doesn't interfere with normal activities     - MODERATE (4-7): Interferes with normal activities or awakens from sleep     - SEVERE (8-10): Excruciating pain, unable to do any normal activities       Moderate pain, sore all over  7. SIZE: For cuts, bruises, or swelling, ask: \"How large is it?\" (e.g., inches or centimeters)       Bruising on hands from a couple days ago.    8. PREGNANCY: \"Is there any chance you are pregnant?\" \"When was your last menstrual period?\"      NA  9. OTHER SYMPTOMS: \"Do you have any other symptoms?\" (e.g., dizziness, fever, weakness; new onset or worsening).       " "Weakness is worsening gradually.  10. CAUSE: \"What do you think caused the fall (or falling)?\" (e.g., tripped, dizzy spell)        Couldn't support own weight.    Protocols used: Falls and [id number]    "

## 2024-10-22 NOTE — PROGRESS NOTES
"  Assessment & Plan     Fall, initial encounter  - has been having frequent falls over the past 2 weeks.  -Most of the time when he tries to stand up for transfer he feels very weak in his thighs not able to balance and falls down.  -Not experiencing  dizzy episodes.  -Last fall was today where he fell on his right side.  Denied consciousness.  Family denied confusion/presyncope/syncopal episodes.    PLAN:  -Started physical therapy from today.  -[facility] home care is helping with physical therapy.  -Wife is primary caretaker and family feels she is getting overwhelmed.  -Family prefers to alternate among kids and keep him home which is becoming tedious.    Due to restriction of right shoulder movement, pain in right elbow , right hip and right hand-x-rays ordered     - UA Macroscopic with reflex to Microscopic and Culture - Lab Collect; Future  - XR Hip Bilateral 2 Views Each; Future  - XR Shoulder Right G/E 3 Views; Future  - XR Hand Right G/E 3 Views; Future  - XR Elbow Right G/E 3 Views; Future  - UA Macroscopic with reflex to Microscopic and Culture - Lab Collect  - UA Microscopic with Reflex to Culture    Urinary frequency  - is wearing pull-ups and over the past 5 days experiencing increased urinary frequency.  -Denied fever/chills/rigors/dysuria.  -Recent HbA1c was 7.9- 2 weeks ago.    -Ordered UA to rule out urine infection.  -He has chronic kidney disease, ordered BMP    - UA Macroscopic with reflex to Microscopic and Culture - Lab Collect; Future  - Basic metabolic panel  (Ca, Cl, CO2, Creat, Gluc, K, Na, BUN); Future  - CBC with platelets; Future  - UA Macroscopic with reflex to Microscopic and Culture - Lab Collect  - Basic metabolic panel  (Ca, Cl, CO2, Creat, Gluc, K, Na, BUN)  - CBC with platelets  - UA Microscopic with Reflex to Culture          BMI  Estimated body mass index is 33 kg/m  as calculated from the following:    Height as of this encounter: 1.778 m (5' 10\").    Weight as " of this encounter: 104.3 kg (230 lb).             Subjective   Tunde is a 84 year old, presenting for the following health issues:  Fall and UTI (Falls seem)      10/22/2024     3:55 PM   Additional Questions   Roomed by melanie   Accompanied by Juli Daughter, Gorge Hamlin         10/22/2024     3:55 PM   Patient Reported Additional Medications   Patient reports taking the following new medications none     Fall    UTI    History of Present Illness       Reason for visit:  Weakness, falls,urinary symptoms  Symptom onset:  1-2 weeks ago  Symptoms include:  Increasing weakness, urinary concerns, 5 falls in the past week  Symptom intensity:  Moderate  Symptom progression:  Worsening  Had these symptoms before:  No  What makes it worse:  Activity  What makes it better:  Sleep but unable to due to trying to void q 1 hr   He is taking medications regularly.         Genitourinary - Male  Onset/Duration: Last Thursday   Description:   Dysuria (painful urination): No}  Hematuria (blood in urine): YES  Frequency: YES  Waking at night to urinate: YES  Hesitancy (delay in urine): YES  Retention (unable to empty): YES  Decrease in urinary flow: YES  Incontinence: YES  Progression of Symptoms:  worsening  Accompanying Signs & Symptoms:  Fever: No  Back/Flank pain: YES  Urethral discharge: No  Testicle lumps/masses/pain: No  Nausea and/or vomiting: No  Abdominal pain: No  History:   History of frequent UTI s: No  History of kidney stones: No  History of hernias: YES  Personal or Family history of Prostate problems: No  Sexually active: No  Precipitating or alleviating factors: None  Therapies tried and outcome: increase fluid intake and Tylenol    Concern - Rt shoulder pain   Onset: months   Description: achy painful, has numerus falls seeming to right side   Intensity: moderate  Progression of Symptoms:  worsening  Accompanying Signs & Symptoms: dizziness, fatigue , balance   Previous history of similar problem:  "  Precipitating factors:        Worsened by: falling, hit head   Alleviating factors:        Improved by: to get up   Therapies tried and outcome: Physical therapy , tylenol         Review of Systems  Constitutional, HEENT, cardiovascular, pulmonary, gi and gu systems are negative, except as otherwise noted.      Objective    /65 (BP Location: Left arm, Patient Position: Sitting, Cuff Size: Adult Large)   Pulse 79   Temp 98.2  F (36.8  C) (Oral)   Resp 16   Ht 1.778 m (5' 10\")   Wt 104.3 kg (230 lb)   SpO2 96%   BMI 33.00 kg/m    Body mass index is 33 kg/m .  Physical Exam   GENERAL: alert and no distress  NECK: no adenopathy, no asymmetry, masses, or scars  RESP: lungs clear to auscultation - no rales, rhonchi or wheezes  CV: regular rate and rhythm, normal S1 S2, no S3 or S4, no murmur, click or rub, no peripheral edema  ABDOMEN: soft, nontender, no hepatosplenomegaly, no masses and bowel sounds normal  MS: General Deconditioning.            Signed Electronically by: Julio Diaz MD    "

## 2024-10-23 LAB
ANION GAP SERPL CALCULATED.3IONS-SCNC: 14 MMOL/L (ref 7–15)
BUN SERPL-MCNC: 39.4 MG/DL (ref 8–23)
CALCIUM SERPL-MCNC: 8.9 MG/DL (ref 8.8–10.4)
CHLORIDE SERPL-SCNC: 105 MMOL/L (ref 98–107)
CREAT SERPL-MCNC: 2.99 MG/DL (ref 0.67–1.17)
EGFRCR SERPLBLD CKD-EPI 2021: 20 ML/MIN/1.73M2
GLUCOSE SERPL-MCNC: 212 MG/DL (ref 70–99)
HCO3 SERPL-SCNC: 18 MMOL/L (ref 22–29)
POTASSIUM SERPL-SCNC: 5.5 MMOL/L (ref 3.4–5.3)
SODIUM SERPL-SCNC: 137 MMOL/L (ref 135–145)

## 2024-10-29 ENCOUNTER — TELEPHONE (OUTPATIENT)
Dept: FAMILY MEDICINE | Facility: CLINIC | Age: 84
End: 2024-10-29
Payer: MEDICARE

## 2024-10-29 NOTE — TELEPHONE ENCOUNTER
Forms/Letter Request    Type of form/letter: Home Health Certification and Plan of Care Order # [id number] Cert Period: 10/22/24-12/20/24      Do we have the form/letter: Yes: Faxed in    Who is the form from? [facility] (if other please explain)    Where did/will the form come from? form was faxed in    [facility]    How would you like the form/letter returned: Fax : [phone]    Patient Notified form requests are processed in 5-7 business days:Yes    Could we send this information to you in [employer] or would you prefer to receive a phone call?:   Patient would prefer a phone call   Okay to leave a detailed message?: Yes at Cell number on file:    Telephone Information:   Mobile [phone]

## 2024-10-30 DIAGNOSIS — Z53.9 DIAGNOSIS NOT YET DEFINED: Primary | ICD-10-CM

## 2024-10-30 PROCEDURE — G0180 MD CERTIFICATION HHA PATIENT: HCPCS | Performed by: PREVENTIVE MEDICINE

## 2024-10-31 NOTE — TELEPHONE ENCOUNTER
Form faxed to [facility] [employer] [phone] on 10/31/24 at 7:39am. Copy placed in abstract and original in tc copy bin.

## 2024-11-01 ENCOUNTER — TELEPHONE (OUTPATIENT)
Dept: FAMILY MEDICINE | Facility: CLINIC | Age: 84
End: 2024-11-01
Payer: OTHER GOVERNMENT

## 2024-11-01 NOTE — TELEPHONE ENCOUNTER
LVM xander Cheng to contact our Medical Records department as they are needing multiple visits for an injury.  Phone number for medical records was left.

## 2024-11-01 NOTE — TELEPHONE ENCOUNTER
Order/Referral Request    Who is requesting: Elias Silverio    Orders being requested: office notes from PCP from 7.24.24 to present. Visits regarding right shoulder injury and details of that diagnosis    Reason service is needed/diagnosis: to clear the face to face home care    When are orders needed by: asap    Has this been discussed with Provider: Yes    Does patient have a preference on a Group/Provider/Facility? [facility]    Does patient have an appointment scheduled?: No    Where to send orders: Fax [phone]    Could we send this information to you in [employer] or would you prefer to receive a phone call?:   Patient would prefer a phone call   Okay to leave a detailed message?: Yes at Other phone number:  Skylar BARRY/Samira Silverio,  [phone] x [phone]

## 2024-11-29 ENCOUNTER — LAB REQUISITION (OUTPATIENT)
Dept: LAB | Facility: CLINIC | Age: 84
End: 2024-11-29
Payer: OTHER GOVERNMENT

## 2024-11-29 DIAGNOSIS — N17.8 OTHER ACUTE KIDNEY FAILURE: ICD-10-CM

## 2024-11-29 DIAGNOSIS — D63.1 ANEMIA IN CHRONIC KIDNEY DISEASE (CODE): ICD-10-CM

## 2024-12-02 LAB
ANION GAP SERPL CALCULATED.3IONS-SCNC: 11 MMOL/L (ref 7–15)
BUN SERPL-MCNC: 39.4 MG/DL (ref 8–23)
CALCIUM SERPL-MCNC: 8.9 MG/DL (ref 8.8–10.4)
CHLORIDE SERPL-SCNC: 108 MMOL/L (ref 98–107)
CREAT SERPL-MCNC: 2.38 MG/DL (ref 0.67–1.17)
EGFRCR SERPLBLD CKD-EPI 2021: 26 ML/MIN/1.73M2
ERYTHROCYTE [DISTWIDTH] IN BLOOD BY AUTOMATED COUNT: 13.2 % (ref 10–15)
GLUCOSE SERPL-MCNC: 153 MG/DL (ref 70–99)
HCO3 SERPL-SCNC: 23 MMOL/L (ref 22–29)
HCT VFR BLD AUTO: 36.7 % (ref 40–53)
HGB BLD-MCNC: 11.4 G/DL (ref 13.3–17.7)
MCH RBC QN AUTO: 32.6 PG (ref 26.5–33)
MCHC RBC AUTO-ENTMCNC: 31.1 G/DL (ref 31.5–36.5)
MCV RBC AUTO: 105 FL (ref 78–100)
PLATELET # BLD AUTO: 187 10E3/UL (ref 150–450)
POTASSIUM SERPL-SCNC: 5 MMOL/L (ref 3.4–5.3)
RBC # BLD AUTO: 3.5 10E6/UL (ref 4.4–5.9)
SODIUM SERPL-SCNC: 142 MMOL/L (ref 135–145)
WBC # BLD AUTO: 9 10E3/UL (ref 4–11)

## 2024-12-02 PROCEDURE — P9604 ONE-WAY ALLOW PRORATED TRIP: HCPCS | Mod: ORL | Performed by: FAMILY MEDICINE

## 2024-12-02 PROCEDURE — 84295 ASSAY OF SERUM SODIUM: CPT | Performed by: FAMILY MEDICINE

## 2024-12-02 PROCEDURE — 82435 ASSAY OF BLOOD CHLORIDE: CPT | Performed by: FAMILY MEDICINE

## 2024-12-02 PROCEDURE — 85027 COMPLETE CBC AUTOMATED: CPT | Performed by: FAMILY MEDICINE

## 2024-12-02 PROCEDURE — 36415 COLL VENOUS BLD VENIPUNCTURE: CPT | Mod: ORL | Performed by: FAMILY MEDICINE

## 2024-12-22 ENCOUNTER — HEALTH MAINTENANCE LETTER (OUTPATIENT)
Age: 84
End: 2024-12-22

## 2025-01-10 ENCOUNTER — LAB REQUISITION (OUTPATIENT)
Dept: LAB | Facility: CLINIC | Age: 85
End: 2025-01-10
Payer: MEDICARE

## 2025-01-10 DIAGNOSIS — E11.65 TYPE 2 DIABETES MELLITUS WITH HYPERGLYCEMIA (H): ICD-10-CM

## 2025-01-13 LAB
EST. AVERAGE GLUCOSE BLD GHB EST-MCNC: 154 MG/DL
HBA1C MFR BLD: 7 %

## 2025-01-13 PROCEDURE — P9604 ONE-WAY ALLOW PRORATED TRIP: HCPCS | Mod: ORL | Performed by: FAMILY MEDICINE

## 2025-01-13 PROCEDURE — 83036 HEMOGLOBIN GLYCOSYLATED A1C: CPT | Mod: ORL | Performed by: FAMILY MEDICINE

## 2025-01-13 PROCEDURE — 36415 COLL VENOUS BLD VENIPUNCTURE: CPT | Mod: ORL | Performed by: FAMILY MEDICINE

## 2025-01-14 ENCOUNTER — LAB REQUISITION (OUTPATIENT)
Dept: LAB | Facility: CLINIC | Age: 85
End: 2025-01-14
Payer: MEDICARE

## 2025-01-14 DIAGNOSIS — G47.51 CONFUSIONAL AROUSALS: ICD-10-CM

## 2025-01-14 DIAGNOSIS — R35.0 FREQUENCY OF MICTURITION: ICD-10-CM

## 2025-01-14 DIAGNOSIS — R32 UNSPECIFIED URINARY INCONTINENCE: ICD-10-CM

## 2025-01-15 LAB
ALBUMIN SERPL BCG-MCNC: 3.8 G/DL (ref 3.5–5.2)
ALBUMIN UR-MCNC: NEGATIVE MG/DL
ALP SERPL-CCNC: 84 U/L (ref 40–150)
ALT SERPL W P-5'-P-CCNC: 14 U/L (ref 0–70)
ANION GAP SERPL CALCULATED.3IONS-SCNC: 13 MMOL/L (ref 7–15)
APPEARANCE UR: ABNORMAL
AST SERPL W P-5'-P-CCNC: 23 U/L (ref 0–45)
BASOPHILS # BLD AUTO: 0 10E3/UL (ref 0–0.2)
BASOPHILS NFR BLD AUTO: 0 %
BILIRUB SERPL-MCNC: 0.2 MG/DL
BILIRUB UR QL STRIP: NEGATIVE
BUN SERPL-MCNC: 37.2 MG/DL (ref 8–23)
CALCIUM SERPL-MCNC: 9.5 MG/DL (ref 8.8–10.4)
CHLORIDE SERPL-SCNC: 108 MMOL/L (ref 98–107)
COLOR UR AUTO: ABNORMAL
CREAT SERPL-MCNC: 1.7 MG/DL (ref 0.67–1.17)
EGFRCR SERPLBLD CKD-EPI 2021: 39 ML/MIN/1.73M2
EOSINOPHIL # BLD AUTO: 0.3 10E3/UL (ref 0–0.7)
EOSINOPHIL NFR BLD AUTO: 3 %
ERYTHROCYTE [DISTWIDTH] IN BLOOD BY AUTOMATED COUNT: 12.9 % (ref 10–15)
GLUCOSE UR STRIP-MCNC: NEGATIVE MG/DL
HCO3 SERPL-SCNC: 21 MMOL/L (ref 22–29)
HCT VFR BLD AUTO: 38.3 % (ref 40–53)
HGB BLD-MCNC: 11.8 G/DL (ref 13.3–17.7)
HGB UR QL STRIP: NEGATIVE
IMM GRANULOCYTES # BLD: 0.1 10E3/UL
IMM GRANULOCYTES NFR BLD: 1 %
KETONES UR STRIP-MCNC: NEGATIVE MG/DL
LEUKOCYTE ESTERASE UR QL STRIP: NEGATIVE
LYMPHOCYTES # BLD AUTO: 1.7 10E3/UL (ref 0.8–5.3)
LYMPHOCYTES NFR BLD AUTO: 18 %
MCH RBC QN AUTO: 31.3 PG (ref 26.5–33)
MCHC RBC AUTO-ENTMCNC: 30.8 G/DL (ref 31.5–36.5)
MCV RBC AUTO: 102 FL (ref 78–100)
MONOCYTES # BLD AUTO: 0.7 10E3/UL (ref 0–1.3)
MONOCYTES NFR BLD AUTO: 8 %
MUCOUS THREADS #/AREA URNS LPF: PRESENT /LPF
NEUTROPHILS # BLD AUTO: 6.7 10E3/UL (ref 1.6–8.3)
NEUTROPHILS NFR BLD AUTO: 71 %
NITRATE UR QL: NEGATIVE
NRBC # BLD AUTO: 0 10E3/UL
NRBC BLD AUTO-RTO: 0 /100
PH UR STRIP: 5 [PH] (ref 5–7)
PLATELET # BLD AUTO: 159 10E3/UL (ref 150–450)
POTASSIUM SERPL-SCNC: 4.8 MMOL/L (ref 3.4–5.3)
PROT SERPL-MCNC: 6.6 G/DL (ref 6.4–8.3)
RBC # BLD AUTO: 3.77 10E6/UL (ref 4.4–5.9)
RBC URINE: 0 /HPF
SODIUM SERPL-SCNC: 142 MMOL/L (ref 135–145)
SP GR UR STRIP: 1.02 (ref 1–1.03)
URATE CRY #/AREA URNS HPF: ABNORMAL /HPF
UROBILINOGEN UR STRIP-MCNC: NORMAL MG/DL
WBC # BLD AUTO: 9.5 10E3/UL (ref 4–11)
WBC URINE: <1 /HPF

## 2025-01-15 PROCEDURE — 80053 COMPREHEN METABOLIC PANEL: CPT | Mod: ORL | Performed by: FAMILY MEDICINE

## 2025-01-15 PROCEDURE — P9604 ONE-WAY ALLOW PRORATED TRIP: HCPCS | Mod: ORL | Performed by: FAMILY MEDICINE

## 2025-01-15 PROCEDURE — 85025 COMPLETE CBC W/AUTO DIFF WBC: CPT | Mod: ORL | Performed by: FAMILY MEDICINE

## 2025-01-15 PROCEDURE — 36415 COLL VENOUS BLD VENIPUNCTURE: CPT | Mod: ORL | Performed by: FAMILY MEDICINE

## 2025-01-16 LAB — GLUCOSE SERPL-MCNC: 223 MG/DL (ref 70–99)

## 2025-03-03 ENCOUNTER — LAB REQUISITION (OUTPATIENT)
Dept: LAB | Facility: CLINIC | Age: 85
End: 2025-03-03
Payer: MEDICARE

## 2025-03-03 DIAGNOSIS — Z71.89 OTHER SPECIFIED COUNSELING: ICD-10-CM

## 2025-03-03 PROCEDURE — 87086 URINE CULTURE/COLONY COUNT: CPT | Mod: ORL | Performed by: FAMILY MEDICINE

## 2025-03-03 PROCEDURE — 81001 URINALYSIS AUTO W/SCOPE: CPT | Mod: ORL | Performed by: FAMILY MEDICINE

## 2025-03-04 LAB
ALBUMIN UR-MCNC: 10 MG/DL
APPEARANCE UR: ABNORMAL
BILIRUB UR QL STRIP: NEGATIVE
COLOR UR AUTO: YELLOW
GLUCOSE UR STRIP-MCNC: NEGATIVE MG/DL
HGB UR QL STRIP: ABNORMAL
KETONES UR STRIP-MCNC: NEGATIVE MG/DL
LEUKOCYTE ESTERASE UR QL STRIP: ABNORMAL
NITRATE UR QL: NEGATIVE
PH UR STRIP: 5 [PH] (ref 5–7)
RBC URINE: 6 /HPF
SP GR UR STRIP: 1.02 (ref 1–1.03)
UROBILINOGEN UR STRIP-MCNC: NORMAL MG/DL
WBC CLUMPS #/AREA URNS HPF: PRESENT /HPF
WBC URINE: >182 /HPF

## 2025-03-05 ENCOUNTER — LAB REQUISITION (OUTPATIENT)
Dept: LAB | Facility: CLINIC | Age: 85
End: 2025-03-05
Payer: MEDICARE

## 2025-03-05 ENCOUNTER — PATIENT OUTREACH (OUTPATIENT)
Dept: CARE COORDINATION | Facility: CLINIC | Age: 85
End: 2025-03-05
Payer: MEDICARE

## 2025-03-05 DIAGNOSIS — G47.51 CONFUSIONAL AROUSALS: ICD-10-CM

## 2025-03-05 LAB — BACTERIA UR CULT: NORMAL

## 2025-03-06 LAB
BASOPHILS # BLD AUTO: 0 10E3/UL (ref 0–0.2)
BASOPHILS NFR BLD AUTO: 0 %
EOSINOPHIL # BLD AUTO: 0.3 10E3/UL (ref 0–0.7)
EOSINOPHIL NFR BLD AUTO: 3 %
ERYTHROCYTE [DISTWIDTH] IN BLOOD BY AUTOMATED COUNT: 13.2 % (ref 10–15)
HCT VFR BLD AUTO: 38.8 % (ref 40–53)
HGB BLD-MCNC: 11.9 G/DL (ref 13.3–17.7)
IMM GRANULOCYTES # BLD: 0 10E3/UL
IMM GRANULOCYTES NFR BLD: 0 %
LYMPHOCYTES # BLD AUTO: 1.6 10E3/UL (ref 0.8–5.3)
LYMPHOCYTES NFR BLD AUTO: 15 %
MCH RBC QN AUTO: 31.1 PG (ref 26.5–33)
MCHC RBC AUTO-ENTMCNC: 30.7 G/DL (ref 31.5–36.5)
MCV RBC AUTO: 101 FL (ref 78–100)
MONOCYTES # BLD AUTO: 0.9 10E3/UL (ref 0–1.3)
MONOCYTES NFR BLD AUTO: 9 %
NEUTROPHILS # BLD AUTO: 7.3 10E3/UL (ref 1.6–8.3)
NEUTROPHILS NFR BLD AUTO: 72 %
NRBC # BLD AUTO: 0 10E3/UL
NRBC BLD AUTO-RTO: 0 /100
PLATELET # BLD AUTO: 195 10E3/UL (ref 150–450)
RBC # BLD AUTO: 3.83 10E6/UL (ref 4.4–5.9)
WBC # BLD AUTO: 10.2 10E3/UL (ref 4–11)

## 2025-03-06 PROCEDURE — 85025 COMPLETE CBC W/AUTO DIFF WBC: CPT | Mod: ORL | Performed by: FAMILY MEDICINE

## 2025-03-06 PROCEDURE — 80053 COMPREHEN METABOLIC PANEL: CPT | Mod: ORL | Performed by: FAMILY MEDICINE

## 2025-03-06 PROCEDURE — P9604 ONE-WAY ALLOW PRORATED TRIP: HCPCS | Mod: ORL | Performed by: FAMILY MEDICINE

## 2025-03-06 PROCEDURE — 36415 COLL VENOUS BLD VENIPUNCTURE: CPT | Mod: ORL | Performed by: FAMILY MEDICINE

## 2025-03-07 LAB
ALBUMIN SERPL BCG-MCNC: 4 G/DL (ref 3.5–5.2)
ALP SERPL-CCNC: 95 U/L (ref 40–150)
ALT SERPL W P-5'-P-CCNC: 16 U/L (ref 0–70)
ANION GAP SERPL CALCULATED.3IONS-SCNC: 11 MMOL/L (ref 7–15)
AST SERPL W P-5'-P-CCNC: 18 U/L (ref 0–45)
BILIRUB SERPL-MCNC: 0.2 MG/DL
BUN SERPL-MCNC: 48.7 MG/DL (ref 8–23)
CALCIUM SERPL-MCNC: 8.9 MG/DL (ref 8.8–10.4)
CHLORIDE SERPL-SCNC: 107 MMOL/L (ref 98–107)
CREAT SERPL-MCNC: 1.79 MG/DL (ref 0.67–1.17)
EGFRCR SERPLBLD CKD-EPI 2021: 36 ML/MIN/1.73M2
GLUCOSE SERPL-MCNC: 200 MG/DL (ref 70–99)
HCO3 SERPL-SCNC: 23 MMOL/L (ref 22–29)
POTASSIUM SERPL-SCNC: 4.4 MMOL/L (ref 3.4–5.3)
PROT SERPL-MCNC: 6.9 G/DL (ref 6.4–8.3)
SODIUM SERPL-SCNC: 141 MMOL/L (ref 135–145)

## 2025-04-11 ENCOUNTER — LAB REQUISITION (OUTPATIENT)
Dept: LAB | Facility: CLINIC | Age: 85
End: 2025-04-11
Payer: MEDICARE

## 2025-04-11 DIAGNOSIS — E11.65 TYPE 2 DIABETES MELLITUS WITH HYPERGLYCEMIA (H): ICD-10-CM

## 2025-04-14 LAB
ANION GAP SERPL CALCULATED.3IONS-SCNC: 13 MMOL/L (ref 7–15)
BUN SERPL-MCNC: 61.5 MG/DL (ref 8–23)
CALCIUM SERPL-MCNC: 9 MG/DL (ref 8.8–10.4)
CHLORIDE SERPL-SCNC: 107 MMOL/L (ref 98–107)
CREAT SERPL-MCNC: 2.79 MG/DL (ref 0.67–1.17)
EGFRCR SERPLBLD CKD-EPI 2021: 21 ML/MIN/1.73M2
EST. AVERAGE GLUCOSE BLD GHB EST-MCNC: 186 MG/DL
GLUCOSE SERPL-MCNC: 259 MG/DL (ref 70–99)
HBA1C MFR BLD: 8.1 %
HCO3 SERPL-SCNC: 19 MMOL/L (ref 22–29)
POTASSIUM SERPL-SCNC: 4.9 MMOL/L (ref 3.4–5.3)
SODIUM SERPL-SCNC: 139 MMOL/L (ref 135–145)

## 2025-04-14 PROCEDURE — 83036 HEMOGLOBIN GLYCOSYLATED A1C: CPT | Mod: ORL | Performed by: FAMILY MEDICINE

## 2025-04-14 PROCEDURE — 36415 COLL VENOUS BLD VENIPUNCTURE: CPT | Mod: ORL | Performed by: FAMILY MEDICINE

## 2025-04-14 PROCEDURE — P9603 ONE-WAY ALLOW PRORATED MILES: HCPCS | Mod: ORL | Performed by: FAMILY MEDICINE

## 2025-04-14 PROCEDURE — 80048 BASIC METABOLIC PNL TOTAL CA: CPT | Mod: ORL | Performed by: FAMILY MEDICINE

## 2025-04-28 ENCOUNTER — LAB REQUISITION (OUTPATIENT)
Dept: LAB | Facility: CLINIC | Age: 85
End: 2025-04-28
Payer: MEDICARE

## 2025-04-28 DIAGNOSIS — N17.8 OTHER ACUTE KIDNEY FAILURE: ICD-10-CM

## 2025-04-29 LAB
ANION GAP SERPL CALCULATED.3IONS-SCNC: 12 MMOL/L (ref 7–15)
BUN SERPL-MCNC: 61.7 MG/DL (ref 8–23)
CALCIUM SERPL-MCNC: 8.9 MG/DL (ref 8.8–10.4)
CHLORIDE SERPL-SCNC: 109 MMOL/L (ref 98–107)
CREAT SERPL-MCNC: 2.71 MG/DL (ref 0.67–1.17)
EGFRCR SERPLBLD CKD-EPI 2021: 22 ML/MIN/1.73M2
GLUCOSE SERPL-MCNC: 177 MG/DL (ref 70–99)
HCO3 SERPL-SCNC: 19 MMOL/L (ref 22–29)
POTASSIUM SERPL-SCNC: 4.9 MMOL/L (ref 3.4–5.3)
SODIUM SERPL-SCNC: 140 MMOL/L (ref 135–145)

## 2025-06-25 ENCOUNTER — LAB REQUISITION (OUTPATIENT)
Dept: LAB | Facility: CLINIC | Age: 85
End: 2025-06-25
Payer: MEDICARE

## 2025-06-25 DIAGNOSIS — R30.0 DYSURIA: ICD-10-CM

## 2025-06-27 LAB
ALBUMIN UR-MCNC: NEGATIVE MG/DL
APPEARANCE UR: CLEAR
BACTERIA #/AREA URNS HPF: ABNORMAL /HPF
BILIRUB UR QL STRIP: NEGATIVE
COLOR UR AUTO: ABNORMAL
GLUCOSE UR STRIP-MCNC: NEGATIVE MG/DL
HGB UR QL STRIP: ABNORMAL
KETONES UR STRIP-MCNC: NEGATIVE MG/DL
LEUKOCYTE ESTERASE UR QL STRIP: ABNORMAL
NITRATE UR QL: POSITIVE
PH UR STRIP: 5.5 [PH] (ref 5–7)
RBC URINE: <1 /HPF
SP GR UR STRIP: 1.02 (ref 1–1.03)
SQUAMOUS EPITHELIAL: <1 /HPF
UROBILINOGEN UR STRIP-MCNC: NORMAL MG/DL
WBC CLUMPS #/AREA URNS HPF: PRESENT /HPF
WBC URINE: 12 /HPF

## 2025-06-27 PROCEDURE — 87086 URINE CULTURE/COLONY COUNT: CPT | Mod: ORL | Performed by: FAMILY MEDICINE

## 2025-06-27 PROCEDURE — 81001 URINALYSIS AUTO W/SCOPE: CPT | Mod: ORL | Performed by: FAMILY MEDICINE

## 2025-06-28 LAB — BACTERIA UR CULT: NORMAL

## 2025-07-26 ENCOUNTER — HEALTH MAINTENANCE LETTER (OUTPATIENT)
Age: 85
End: 2025-07-26